# Patient Record
Sex: FEMALE | Race: WHITE | NOT HISPANIC OR LATINO | Employment: FULL TIME | RURAL
[De-identification: names, ages, dates, MRNs, and addresses within clinical notes are randomized per-mention and may not be internally consistent; named-entity substitution may affect disease eponyms.]

---

## 2021-07-11 ENCOUNTER — HOSPITAL ENCOUNTER (EMERGENCY)
Facility: HOSPITAL | Age: 31
Discharge: HOME | End: 2021-07-11
Payer: MEDICAID

## 2021-07-11 VITALS
DIASTOLIC BLOOD PRESSURE: 79 MMHG | WEIGHT: 173 LBS | RESPIRATION RATE: 16 BRPM | OXYGEN SATURATION: 100 % | SYSTOLIC BLOOD PRESSURE: 124 MMHG | TEMPERATURE: 98 F | BODY MASS INDEX: 28.82 KG/M2 | HEART RATE: 76 BPM | HEIGHT: 65 IN

## 2021-07-11 DIAGNOSIS — S99.911A INJURY OF RIGHT ANKLE: ICD-10-CM

## 2021-07-11 DIAGNOSIS — S93.401A SPRAIN OF RIGHT ANKLE, UNSPECIFIED LIGAMENT, INITIAL ENCOUNTER: Primary | ICD-10-CM

## 2021-07-11 PROCEDURE — 99283 EMERGENCY DEPT VISIT LOW MDM: CPT | Mod: ,,, | Performed by: NURSE PRACTITIONER

## 2021-07-11 PROCEDURE — 63600175 PHARM REV CODE 636 W HCPCS: Performed by: NURSE PRACTITIONER

## 2021-07-11 PROCEDURE — 99283 PR EMERGENCY DEPT VISIT,LEVEL III: ICD-10-PCS | Mod: ,,, | Performed by: NURSE PRACTITIONER

## 2021-07-11 PROCEDURE — 96372 THER/PROPH/DIAG INJ SC/IM: CPT

## 2021-07-11 PROCEDURE — 99284 EMERGENCY DEPT VISIT MOD MDM: CPT

## 2021-07-11 RX ORDER — FLUOXETINE 10 MG/1
10 CAPSULE ORAL DAILY
COMMUNITY

## 2021-07-11 RX ORDER — KETOROLAC TROMETHAMINE 30 MG/ML
30 INJECTION, SOLUTION INTRAMUSCULAR; INTRAVENOUS
Status: COMPLETED | OUTPATIENT
Start: 2021-07-11 | End: 2021-07-11

## 2021-07-11 RX ADMIN — KETOROLAC TROMETHAMINE 30 MG: 30 INJECTION, SOLUTION INTRAMUSCULAR at 11:07

## 2021-07-12 ENCOUNTER — TELEPHONE (OUTPATIENT)
Dept: EMERGENCY MEDICINE | Facility: HOSPITAL | Age: 31
End: 2021-07-12

## 2023-05-30 ENCOUNTER — HOSPITAL ENCOUNTER (EMERGENCY)
Facility: HOSPITAL | Age: 33
Discharge: HOME | End: 2023-05-30
Payer: MEDICAID

## 2023-05-30 VITALS
TEMPERATURE: 99 F | WEIGHT: 206.38 LBS | HEART RATE: 84 BPM | HEIGHT: 65 IN | BODY MASS INDEX: 34.38 KG/M2 | OXYGEN SATURATION: 96 % | DIASTOLIC BLOOD PRESSURE: 70 MMHG | RESPIRATION RATE: 18 BRPM | SYSTOLIC BLOOD PRESSURE: 139 MMHG
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
## 2023-05-30 PROCEDURE — 99284 EMERGENCY DEPT VISIT MOD MDM: CPT | Mod: ,,, | Performed by: NURSE PRACTITIONER

## 2023-05-30 PROCEDURE — 99284 EMERGENCY DEPT VISIT MOD MDM: CPT | Mod: 25

## 2023-05-30 PROCEDURE — 90471 IMMUNIZATION ADMIN: CPT | Performed by: NURSE PRACTITIONER

## 2023-05-30 PROCEDURE — 63600175 PHARM REV CODE 636 W HCPCS: Performed by: NURSE PRACTITIONER

## 2023-05-30 PROCEDURE — 90715 TDAP VACCINE 7 YRS/> IM: CPT | Performed by: NURSE PRACTITIONER

## 2023-05-30 PROCEDURE — 99284 PR EMERGENCY DEPT VISIT,LEVEL IV: ICD-10-PCS | Mod: ,,, | Performed by: NURSE PRACTITIONER

## 2023-05-30 RX ORDER — DOXYCYCLINE 100 MG/1
100 CAPSULE ORAL 2 TIMES DAILY
Qty: 20 CAPSULE | Refills: 0 | Status: SHIPPED | OUTPATIENT
Start: 2023-05-30 | End: 2023-06-09

## 2023-05-30 RX ADMIN — TETANUS TOXOID, REDUCED DIPHTHERIA TOXOID AND ACELLULAR PERTUSSIS VACCINE, ADSORBED 0.5 ML: 5; 2.5; 8; 8; 2.5 SUSPENSION INTRAMUSCULAR at 07:05

## 2023-05-30 NOTE — Clinical Note
"Eliza Anders" Carine was seen and treated in our emergency department on 5/30/2023.  She may return to work on 05/31/2023.       If you have any questions or concerns, please don't hesitate to call.      ELMIRA Dwyer"

## 2023-05-31 NOTE — DISCHARGE INSTRUCTIONS
Keep the wound clean and dry.  Take antibiotic as prescribed.  Return to the ER for fever, purulent drainage or redness.

## 2023-05-31 NOTE — ED PROVIDER NOTES
Encounter Date: 5/30/2023       History     Chief Complaint   Patient presents with    Animal Bite     33 year old  female presents to the ER for dog bite to the back of her left lower leg this afternoon.  She does not know who the dog belongs to.     The history is provided by the patient.   Animal Bite   The incident occurred 2 to 3 hours ago. The incident occurred at home. She came to the ER via by private vehicle. Pertinent negatives include no chest pain, no altered mental status, no numbness, no visual disturbance, no abdominal pain, no bowel incontinence, no nausea, no vomiting, no bladder incontinence, no headaches, no hearing loss, no inability to bear weight, no neck pain, no pain when bearing weight, no focal weakness, no decreased responsiveness, no light-headedness, no loss of consciousness, no seizures, no tingling, no weakness, no cough, no difficulty breathing and no memory loss. There have been no prior injuries to these areas. Her tetanus status is out of date. She has received no recent medical care.   Review of patient's allergies indicates:   Allergen Reactions    Cefaclor Hives     Past Medical History:   Diagnosis Date    Depression      Past Surgical History:   Procedure Laterality Date    TONSILLECTOMY       History reviewed. No pertinent family history.  Social History     Tobacco Use    Smoking status: Never    Smokeless tobacco: Never   Substance Use Topics    Alcohol use: Never    Drug use: Never     Review of Systems   Constitutional:  Negative for decreased responsiveness and fever.   HENT:  Negative for hearing loss and sore throat.    Eyes:  Negative for visual disturbance.   Respiratory:  Negative for cough and shortness of breath.    Cardiovascular:  Negative for chest pain.   Gastrointestinal:  Negative for abdominal pain, bowel incontinence, nausea and vomiting.   Genitourinary:  Negative for bladder incontinence and dysuria.   Musculoskeletal:  Negative for back pain  and neck pain.   Skin:  Positive for wound. Negative for rash.   Neurological:  Negative for tingling, focal weakness, seizures, loss of consciousness, weakness, light-headedness, numbness and headaches.   Hematological:  Does not bruise/bleed easily.   Psychiatric/Behavioral:  Negative for memory loss.      Physical Exam     Initial Vitals [05/30/23 1946]   BP Pulse Resp Temp SpO2   (!) 147/71 90 18 98.6 °F (37 °C) 96 %      MAP       --         Physical Exam    Nursing note and vitals reviewed.  Constitutional: She appears well-developed and well-nourished. She is not diaphoretic. She is Obese . She is cooperative.  Non-toxic appearance. She does not have a sickly appearance. She does not appear ill. No distress.   HENT:   Head: Normocephalic and atraumatic.   Eyes: Pupils are equal, round, and reactive to light.   Neck: Neck supple.   Cardiovascular:  Normal rate, regular rhythm, normal heart sounds and intact distal pulses.     Exam reveals no gallop and no friction rub.       No murmur heard.  Pulmonary/Chest: Breath sounds normal. No respiratory distress. She has no wheezes. She has no rhonchi. She has no rales. She exhibits no tenderness.   Musculoskeletal:      Cervical back: Neck supple.     Neurological: She is alert and oriented to person, place, and time.   Skin: Skin is warm and dry. Capillary refill takes less than 2 seconds.        Psychiatric: She has a normal mood and affect.       Medical Screening Exam   See Full Note    ED Course   Procedures  Labs Reviewed - No data to display       Imaging Results    None          Medications   Tdap (BOOSTRIX) vaccine injection 0.5 mL (has no administration in time range)     Medical Decision Making:   ED Management:  Patient has allergy to Ceclor.  Will send home of Doxy                       Clinical Impression:   Final diagnoses:  [W54.0XXA] Dog bite, initial encounter (Primary)        ED Disposition Condition    Discharge Stable          ED Prescriptions        Medication Sig Dispense Start Date End Date Auth. Provider    doxycycline (VIBRAMYCIN) 100 MG Cap Take 1 capsule (100 mg total) by mouth 2 (two) times daily. for 10 days 20 capsule 5/30/2023 6/9/2023 ELMIRA Dwyer          Follow-up Information       Follow up With Specialties Details Why Contact Info    [place] [facility]  Schedule an appointment as soon as possible for a visit in 1 week For wound re-check [address] [place] [id number]  [phone]             ELMIRA Dwyer  05/30/23 1954

## 2024-10-05 ENCOUNTER — HOSPITAL ENCOUNTER (EMERGENCY)
Facility: HOSPITAL | Age: 34
Discharge: HOME | End: 2024-10-05
Payer: MEDICAID

## 2024-10-05 VITALS
HEIGHT: 65 IN | OXYGEN SATURATION: 98 % | HEART RATE: 94 BPM | RESPIRATION RATE: 18 BRPM | BODY MASS INDEX: 33.66 KG/M2 | SYSTOLIC BLOOD PRESSURE: 165 MMHG | DIASTOLIC BLOOD PRESSURE: 105 MMHG | WEIGHT: 202 LBS | TEMPERATURE: 98 F

## 2024-10-05 DIAGNOSIS — L60.0 INGROWN NAIL OF GREAT TOE OF RIGHT FOOT: Primary | ICD-10-CM

## 2024-10-05 DIAGNOSIS — L08.9 TOE INFECTION: ICD-10-CM

## 2024-10-05 PROCEDURE — 99284 EMERGENCY DEPT VISIT MOD MDM: CPT | Mod: ,,, | Performed by: NURSE PRACTITIONER

## 2024-10-05 PROCEDURE — 99284 EMERGENCY DEPT VISIT MOD MDM: CPT

## 2024-10-05 RX ORDER — ACYCLOVIR 400 MG/1
400 TABLET ORAL
COMMUNITY
Start: 2024-09-30

## 2024-10-05 RX ORDER — SULFAMETHOXAZOLE AND TRIMETHOPRIM 800; 160 MG/1; MG/1
1 TABLET ORAL 2 TIMES DAILY
Qty: 14 TABLET | Refills: 0 | Status: SHIPPED | OUTPATIENT
Start: 2024-10-05 | End: 2024-10-12

## 2024-10-05 RX ORDER — MUPIROCIN 20 MG/G
OINTMENT TOPICAL 3 TIMES DAILY
Qty: 22 G | Refills: 0 | Status: SHIPPED | OUTPATIENT
Start: 2024-10-05

## 2024-10-05 NOTE — DISCHARGE INSTRUCTIONS
Clean with over the counter antibacterial soap and water. Apply the antibacterial ointment as ordered and take Bactrim as ordered.  Try to wear loose fitting shoes.

## 2024-10-05 NOTE — ED PROVIDER NOTES
Encounter Date: 10/5/2024       History     Chief Complaint   Patient presents with    Toe Pain     Pain to rt great toe     34 year old  female who presents to the ER for right great toe pain, redness, swelling. Denies injury    The history is provided by the patient.     Review of patient's allergies indicates:   Allergen Reactions    Cefaclor Hives     Past Medical History:   Diagnosis Date    Depression      Past Surgical History:   Procedure Laterality Date    TONSILLECTOMY       No family history on file.  Social History     Tobacco Use    Smoking status: Never    Smokeless tobacco: Never   Substance Use Topics    Alcohol use: Never    Drug use: Never     Review of Systems   Constitutional:  Negative for fever.   HENT:  Negative for sore throat.    Respiratory:  Negative for shortness of breath.    Cardiovascular:  Negative for chest pain.   Gastrointestinal:  Negative for nausea.   Genitourinary:  Negative for dysuria.   Musculoskeletal:  Negative for back pain.   Skin:  Positive for wound. Negative for rash.   Neurological:  Negative for weakness.   Hematological:  Does not bruise/bleed easily.       Physical Exam     Initial Vitals [10/05/24 1536]   BP Pulse Resp Temp SpO2   (!) 165/105 94 18 98.1 °F (36.7 °C) 98 %      MAP       --         Physical Exam    Nursing note and vitals reviewed.  Constitutional: She appears well-developed and well-nourished. She is not diaphoretic. No distress.   HENT:   Head: Normocephalic and atraumatic.   Eyes: Pupils are equal, round, and reactive to light.   Neck: Neck supple.   Cardiovascular:  Normal rate, regular rhythm, normal heart sounds and intact distal pulses.     Exam reveals no gallop and no friction rub.       No murmur heard.  Pulmonary/Chest: Breath sounds normal. No respiratory distress. She has no wheezes. She has no rhonchi. She has no rales. She exhibits no tenderness.   Musculoskeletal:      Cervical back: Neck supple.      Comments: Right great  toe on medial side of the nail noted with mild erythema, swelling and blood drainage.  Cap refill less than 3 seconds.      Neurological: She is alert and oriented to person, place, and time.   Skin: Skin is warm and dry. Capillary refill takes less than 2 seconds.   Psychiatric: She has a normal mood and affect.         Medical Screening Exam   See Full Note    ED Course   Procedures  Labs Reviewed - No data to display       Imaging Results    None          Medications - No data to display  Medical Decision Making  Problems Addressed:  Ingrown nail of great toe of right foot: self-limited or minor problem  Toe infection: self-limited or minor problem    Risk  Prescription drug management.                                      Clinical Impression:   Final diagnoses:  [L60.0] Ingrown nail of great toe of right foot (Primary)  [L08.9] Toe infection        ED Disposition Condition    Discharge Stable          ED Prescriptions       Medication Sig Dispense Start Date End Date Auth. Provider    mupirocin (BACTROBAN) 2 % ointment Apply topically 3 (three) times daily. 22 g 10/5/2024 -- Clau Olivares FNP    sulfamethoxazole-trimethoprim 800-160mg (BACTRIM DS) 800-160 mg Tab Take 1 tablet by mouth 2 (two) times daily. for 7 days 14 tablet 10/5/2024 10/12/2024 Clau Olivares FNP          Follow-up Information       Follow up With Specialties Details Why Contact Info    [facility]  Schedule an appointment as soon as possible for a visit in 1 week For wound re-check [address] [facility] [address] [phone]             Clau Olivares FNP  10/05/24 2390

## 2024-10-05 NOTE — Clinical Note
"Eliza Anders" Carine was seen and treated in our emergency department on 10/5/2024.  She may return to work on 10/06/2024.       If you have any questions or concerns, please don't hesitate to call.      Clau Olivares, ELMIRA"

## 2024-10-07 ENCOUNTER — HOSPITAL ENCOUNTER (EMERGENCY)
Facility: HOSPITAL | Age: 34
Discharge: HOME | End: 2024-10-07
Payer: MEDICAID

## 2024-10-07 VITALS
BODY MASS INDEX: 34.8 KG/M2 | OXYGEN SATURATION: 100 % | SYSTOLIC BLOOD PRESSURE: 117 MMHG | DIASTOLIC BLOOD PRESSURE: 93 MMHG | HEIGHT: 64 IN | TEMPERATURE: 98 F | WEIGHT: 203.81 LBS | RESPIRATION RATE: 18 BRPM | HEART RATE: 73 BPM

## 2024-10-07 DIAGNOSIS — R00.2 PALPITATIONS: ICD-10-CM

## 2024-10-07 LAB
ALBUMIN SERPL BCP-MCNC: 3.9 G/DL (ref 3.5–5)
ALBUMIN/GLOB SERPL: 1 {RATIO}
ALP SERPL-CCNC: 84 U/L (ref 37–98)
ALT SERPL W P-5'-P-CCNC: 41 U/L (ref 13–56)
ANION GAP SERPL CALCULATED.3IONS-SCNC: 14 MMOL/L (ref 7–16)
AST SERPL W P-5'-P-CCNC: 25 U/L (ref 15–37)
BASOPHILS # BLD AUTO: 0.04 K/UL (ref 0–0.2)
BASOPHILS NFR BLD AUTO: 0.4 % (ref 0–1)
BILIRUB SERPL-MCNC: 0.3 MG/DL (ref ?–1.2)
BUN SERPL-MCNC: 14 MG/DL (ref 7–18)
BUN/CREAT SERPL: 13 (ref 6–20)
CALCIUM SERPL-MCNC: 9.6 MG/DL (ref 8.5–10.1)
CHLORIDE SERPL-SCNC: 100 MMOL/L (ref 98–107)
CO2 SERPL-SCNC: 25 MMOL/L (ref 21–32)
CREAT SERPL-MCNC: 1.12 MG/DL (ref 0.55–1.02)
DIFFERENTIAL METHOD BLD: ABNORMAL
EGFR (NO RACE VARIABLE) (RUSH/TITUS): 67 ML/MIN/1.73M2
EOSINOPHIL # BLD AUTO: 0.31 K/UL (ref 0–0.5)
EOSINOPHIL NFR BLD AUTO: 3.4 % (ref 1–4)
ERYTHROCYTE [DISTWIDTH] IN BLOOD BY AUTOMATED COUNT: 12.4 % (ref 11.5–14.5)
GLOBULIN SER-MCNC: 4 G/DL (ref 2–4)
GLUCOSE SERPL-MCNC: 91 MG/DL (ref 74–106)
HCG UR QL IA.RAPID: NEGATIVE
HCT VFR BLD AUTO: 40.6 % (ref 38–47)
HGB BLD-MCNC: 13.8 G/DL (ref 12–16)
IMM GRANULOCYTES # BLD AUTO: 0.03 K/UL (ref 0–0.04)
IMM GRANULOCYTES NFR BLD: 0.3 % (ref 0–0.4)
LYMPHOCYTES # BLD AUTO: 2.75 K/UL (ref 1–4.8)
LYMPHOCYTES NFR BLD AUTO: 30.3 % (ref 27–41)
MAGNESIUM SERPL-MCNC: 1.9 MG/DL (ref 1.7–2.3)
MCH RBC QN AUTO: 29.2 PG (ref 27–31)
MCHC RBC AUTO-ENTMCNC: 34 G/DL (ref 32–36)
MCV RBC AUTO: 86 FL (ref 80–96)
MONOCYTES # BLD AUTO: 0.65 K/UL (ref 0–0.8)
MONOCYTES NFR BLD AUTO: 7.2 % (ref 2–6)
MPC BLD CALC-MCNC: 10.1 FL (ref 9.4–12.4)
NEUTROPHILS # BLD AUTO: 5.3 K/UL (ref 1.8–7.7)
NEUTROPHILS NFR BLD AUTO: 58.4 % (ref 53–65)
NRBC # BLD AUTO: 0 X10E3/UL
NRBC, AUTO (.00): 0 %
PLATELET # BLD AUTO: 322 K/UL (ref 150–400)
POTASSIUM SERPL-SCNC: 3.8 MMOL/L (ref 3.5–5.1)
PROT SERPL-MCNC: 7.9 G/DL (ref 6.4–8.2)
RBC # BLD AUTO: 4.72 M/UL (ref 4.2–5.4)
SODIUM SERPL-SCNC: 135 MMOL/L (ref 136–145)
WBC # BLD AUTO: 9.08 K/UL (ref 4.5–11)

## 2024-10-07 PROCEDURE — 81025 URINE PREGNANCY TEST: CPT | Performed by: NURSE PRACTITIONER

## 2024-10-07 PROCEDURE — 99284 EMERGENCY DEPT VISIT MOD MDM: CPT | Mod: ,,, | Performed by: NURSE PRACTITIONER

## 2024-10-07 PROCEDURE — 84443 ASSAY THYROID STIM HORMONE: CPT | Performed by: NURSE PRACTITIONER

## 2024-10-07 PROCEDURE — 85025 COMPLETE CBC W/AUTO DIFF WBC: CPT | Performed by: NURSE PRACTITIONER

## 2024-10-07 PROCEDURE — 83735 ASSAY OF MAGNESIUM: CPT | Performed by: NURSE PRACTITIONER

## 2024-10-07 PROCEDURE — 99285 EMERGENCY DEPT VISIT HI MDM: CPT | Mod: 25

## 2024-10-07 PROCEDURE — 36415 COLL VENOUS BLD VENIPUNCTURE: CPT | Performed by: NURSE PRACTITIONER

## 2024-10-07 PROCEDURE — 93005 ELECTROCARDIOGRAM TRACING: CPT

## 2024-10-07 PROCEDURE — 80053 COMPREHEN METABOLIC PANEL: CPT | Performed by: NURSE PRACTITIONER

## 2024-10-07 NOTE — ED PROVIDER NOTES
Encounter Date: 10/7/2024       History     Chief Complaint   Patient presents with    Palpitations     Pt is a 34 year old  female who presents to the ER for palpitations for 2 days.  She reports they are typically continuous with episodes where they wax and wane.  Pt reports she will feel at times like it takes her breath away.  Pt reports she has hx of anxiety and has been under a lot of stress lately.  She reports that she hasn't taken meds for anxiety in years.      The history is provided by the patient.     Review of patient's allergies indicates:   Allergen Reactions    Cefaclor Hives     Past Medical History:   Diagnosis Date    Depression      Past Surgical History:   Procedure Laterality Date    TONSILLECTOMY       No family history on file.  Social History     Tobacco Use    Smoking status: Never    Smokeless tobacco: Never   Substance Use Topics    Alcohol use: Never    Drug use: Never     Review of Systems   Constitutional:  Negative for fever.   HENT:  Negative for sore throat.    Respiratory:  Positive for shortness of breath. Negative for cough.    Cardiovascular:  Positive for palpitations. Negative for chest pain.   Gastrointestinal:  Negative for nausea.   Genitourinary:  Negative for dysuria.   Musculoskeletal:  Negative for back pain.   Skin:  Negative for rash.   Neurological:  Negative for weakness.   Hematological:  Does not bruise/bleed easily.       Physical Exam     Initial Vitals [10/07/24 1849]   BP Pulse Resp Temp SpO2   (!) 175/96 98 20 97.6 °F (36.4 °C) 100 %      MAP       --         Physical Exam    Nursing note and vitals reviewed.  Constitutional: She appears well-developed and well-nourished. She is not diaphoretic. She is Obese . She is cooperative.  Non-toxic appearance. She does not have a sickly appearance. She does not appear ill. No distress. She is not intubated.   HENT:   Head: Normocephalic and atraumatic.   Eyes: Pupils are equal, round, and reactive to light.    Neck: Neck supple.   Cardiovascular:  Normal rate, regular rhythm, normal heart sounds, intact distal pulses and normal pulses.     Exam reveals no gallop and no friction rub.       No murmur heard.  Pulmonary/Chest: Effort normal and breath sounds normal. No accessory muscle usage. No apnea, no tachypnea and no bradypnea. She is not intubated. No respiratory distress. She has no decreased breath sounds. She has no wheezes. She has no rhonchi. She has no rales. She exhibits no tenderness.   Musculoskeletal:      Cervical back: Neck supple.     Neurological: She is alert and oriented to person, place, and time.   Skin: Skin is warm and dry. Capillary refill takes less than 2 seconds.   Psychiatric: She has a normal mood and affect.         Medical Screening Exam   See Full Note    ED Course   Procedures  Labs Reviewed   COMPREHENSIVE METABOLIC PANEL - Abnormal       Result Value    Sodium 135 (*)     Potassium 3.8      Chloride 100      CO2 25      Anion Gap 14      Glucose 91      BUN 14      Creatinine 1.12 (*)     BUN/Creatinine Ratio 13      Calcium 9.6      Total Protein 7.9      Albumin 3.9      Globulin 4.0      A/G Ratio 1.0      Bilirubin, Total 0.3      Alk Phos 84      ALT 41      AST 25      eGFR 67     CBC WITH DIFFERENTIAL - Abnormal    WBC 9.08      RBC 4.72      Hemoglobin 13.8      Hematocrit 40.6      MCV 86.0      MCH 29.2      MCHC 34.0      RDW 12.4      Platelet Count 322      MPV 10.1      Neutrophils % 58.4      Lymphocytes % 30.3      Monocytes % 7.2 (*)     Eosinophils % 3.4      Basophils % 0.4      Immature Granulocytes % 0.3      nRBC, Auto 0.0      Neutrophils, Abs 5.30      Lymphocytes, Absolute 2.75      Monocytes, Absolute 0.65      Eosinophils, Absolute 0.31      Basophils, Absolute 0.04      Immature Granulocytes, Absolute 0.03      nRBC, Absolute 0.00      Diff Type Auto     MAGNESIUM - Normal    Magnesium 1.9     HCG QUALITATIVE URINE - Normal    HCG Qualitative, Urine Negative      CBC W/ AUTO DIFFERENTIAL    Narrative:     The following orders were created for panel order CBC auto differential.  Procedure                               Abnormality         Status                     ---------                               -----------         ------                     CBC with Differential[[id number]]        Abnormal            Final result                 Please view results for these tests on the individual orders.   TSH     EKG Readings: (Independently Interpreted)   Initial Reading: No STEMI. Rhythm: Normal Sinus Rhythm. Heart Rate: 95. Ectopy: No Ectopy. Conduction: Normal. ST Segments: Normal ST Segments. T Waves: Normal. Clinical Impression: Normal Sinus Rhythm       Imaging Results              X-Ray Chest PA And Lateral (Final result)  Result time 10/07/24 20:11:47      Final result by Jovon Chauhan MD (10/07/24 20:11:47)                   Impression:      No acute radiographic abnormality.      Electronically signed by: Jovon Chauhan  Date:    10/07/2024  Time:    20:11               Narrative:    EXAMINATION:  XR CHEST PA AND LATERAL    CLINICAL HISTORY:  Palpitations    TECHNIQUE:  PA and lateral views of the chest were performed.    COMPARISON:  None    FINDINGS:  The lungs are clear, with normal appearance of pulmonary vasculature and no pleural effusion or pneumothorax.    The cardiac silhouette is normal in size. The hilar and mediastinal contours are unremarkable.    Bones are intact.                                       Medications - No data to display  Medical Decision Making  Problems Addressed:  Palpitations: acute illness or injury that poses a threat to life or bodily functions    Amount and/or Complexity of Data Reviewed  Labs: ordered. Decision-making details documented in ED Course.  Radiology: ordered. Decision-making details documented in ED Course.  ECG/medicine tests: ordered. Decision-making details documented in ED Course.               ED Course as of  10/07/24 2017   Mon Oct 07, 2024   2015 Work-up was negative for acute findings.  Heart rate/rhythm has been NSR since arrival.  Strict return precautions given. [AG]      ED Course User Index  [AG] Clau Olivares FNP                           Clinical Impression:   Final diagnoses:  [R00.2] Palpitations        ED Disposition Condition    Discharge Stable          ED Prescriptions    None       Follow-up Information       Follow up With Specialties Details Why Contact Info    Rupesh Mantilla MD Family [facility] on 10/9/2024  [address] [place] [address]  [facility] [place] [address]  [phone]               Clau Olivares FNP  10/07/24 2017     49.1

## 2024-10-07 NOTE — ED TRIAGE NOTES
Presents to ER with c/o palpitations. States it seems like it started about 2 days ago.. Was seen here a couple of days ago and was placed on bactrim and acylcovier. Seems to started fast heart rate after that. Has an appointment with Dr. Mantilla on Wednesday. Has a history of anxiety but has been off meds for 10-12 years. Has been having more than usual amount of stress lately. Denies any chest pain.

## 2024-10-07 NOTE — Clinical Note
"Eliza Anders"Carine was seen and treated in our emergency department on 10/7/2024.  She may return to work on 10/08/2024.       If you have any questions or concerns, please don't hesitate to call.      Clau Olivares, ELMIRA"

## 2024-10-08 LAB — TSH SERPL DL<=0.005 MIU/L-ACNC: 2.24 UIU/ML (ref 0.36–3.74)

## 2024-10-08 NOTE — DISCHARGE INSTRUCTIONS
Follow-up with Dr Mantilla as scheduled on Wednesday.  Let him know about your symptoms as well as your history of anxiety and ongoing stress.  If you develop worsening of your palpitations, chest pain or shortness of breath, return to the ER for a recheck.  If you feel the Bactrim is causing your symptoms, I would advise you to stop that medication. Avoid all caffeine.  Try to minimize the stress.

## 2024-10-09 ENCOUNTER — OFFICE VISIT (OUTPATIENT)
Dept: FAMILY MEDICINE | Facility: CLINIC | Age: 34
End: 2024-10-09
Payer: MEDICAID

## 2024-10-09 VITALS
WEIGHT: 199.88 LBS | BODY MASS INDEX: 34.12 KG/M2 | HEIGHT: 64 IN | DIASTOLIC BLOOD PRESSURE: 79 MMHG | SYSTOLIC BLOOD PRESSURE: 113 MMHG | HEART RATE: 84 BPM | OXYGEN SATURATION: 98 %

## 2024-10-09 DIAGNOSIS — R03.0 ELEVATED BLOOD PRESSURE READING: ICD-10-CM

## 2024-10-09 DIAGNOSIS — R00.2 PALPITATIONS: Primary | ICD-10-CM

## 2024-10-09 DIAGNOSIS — F41.9 ANXIETY: ICD-10-CM

## 2024-10-09 PROCEDURE — 3008F BODY MASS INDEX DOCD: CPT | Mod: CPTII,,, | Performed by: FAMILY MEDICINE

## 2024-10-09 PROCEDURE — 99204 OFFICE O/P NEW MOD 45 MIN: CPT | Mod: ,,, | Performed by: FAMILY MEDICINE

## 2024-10-09 PROCEDURE — 1160F RVW MEDS BY RX/DR IN RCRD: CPT | Mod: CPTII,,, | Performed by: FAMILY MEDICINE

## 2024-10-09 PROCEDURE — 1159F MED LIST DOCD IN RCRD: CPT | Mod: CPTII,,, | Performed by: FAMILY MEDICINE

## 2024-10-09 PROCEDURE — 3078F DIAST BP <80 MM HG: CPT | Mod: CPTII,,, | Performed by: FAMILY MEDICINE

## 2024-10-09 PROCEDURE — 3074F SYST BP LT 130 MM HG: CPT | Mod: CPTII,,, | Performed by: FAMILY MEDICINE

## 2024-10-09 NOTE — PROGRESS NOTES
Rupesh Mantilla MD   [facility]  MEDICAL GROUP [facility] FAM[facility]  [address] [place] [address] [place] [id number]  [phone]      PATIENT NAME: Eliza Hawk  : 1990  DATE: 10/9/24  MRN: [id number]      Billing Provider: Rupesh Mantilla MD  Level of Service: IA OFFICE/OUTPT VISIT, NEW, LEVL IV, 45-59 MIN  Patient PCP Information       Provider PCP Type    Rupesh Mantilla MD General            Reason for Visit / Chief Complaint: Palpitations, Shortness of Breath, and Hypertension (Ingrown toenail)       Update PCP  Update Chief Complaint         History of Present Illness / Problem Focused Workflow     Eliza Hawk presents to the clinic with Palpitations, Shortness of Breath, and Hypertension (Ingrown toenail)       New patient.  Follow up from ED visit on 10/07/24 for palpitations.  Cardiac workup was negative.  I have reviewed encounter and all associated labs/imaging/EKG.  BP was elevated.   It is very well controlled today.  Says that palpitations have been present for about a week and are fairly constant.  Started acyclovir daily about 1 1/2 weeks ago.  No other med changes.      Palpitations   Associated symptoms include shortness of breath. Pertinent negatives include no chest pain, coughing, dizziness, fever or weakness.   Shortness of Breath  Pertinent negatives include no abdominal pain, chest pain, fever or sore throat.   Hypertension  Associated symptoms include palpitations and shortness of breath. Pertinent negatives include no chest pain.       Review of Systems     Review of Systems   Constitutional:  Negative for activity change, chills and fever.   HENT:  Negative for sore throat.    Eyes:  Negative for pain.   Respiratory:  Positive for shortness of breath. Negative for cough and chest tightness.    Cardiovascular:  Positive for palpitations. Negative for chest pain.   Gastrointestinal:  Negative for abdominal pain.   Neurological:  Negative for  dizziness, syncope and weakness.   Psychiatric/Behavioral:  Negative for confusion.         Medical / Social / Family History     Past Medical History:   Diagnosis Date    Depression        Past Surgical History:   Procedure Laterality Date    TONSILLECTOMY         Social History    reports that she has never smoked. She has never used smokeless tobacco. She reports that she does not currently use alcohol. She reports that she does not use drugs.   Social History     Tobacco Use    Smoking status: Never    Smokeless tobacco: Never   Substance Use Topics    Alcohol use: Not Currently    Drug use: Never       Family History  Family History   Problem Relation Name Age of Onset    Diabetes Father Franco Hawk     Heart disease Father Franco Hawk     Hypertension Father Franco Hawk        Medications and Allergies     Medications  Outpatient Medications Marked as Taking for the 10/9/24 encounter (Office Visit) with Rupesh Mantilla MD   Medication Sig Dispense Refill    acyclovir (ZOVIRAX) 400 MG tablet Take 400 mg by mouth.      mupirocin (BACTROBAN) 2 % ointment Apply topically 3 (three) times daily. 22 g 0    sulfamethoxazole-trimethoprim 800-160mg (BACTRIM DS) 800-160 mg Tab Take 1 tablet by mouth 2 (two) times daily. for 7 days 14 tablet 0       Allergies  Review of patient's allergies indicates:   Allergen Reactions    Cefaclor Hives       Physical Examination     Vitals:    10/09/24 1013   BP: 113/79   Pulse: 84     Physical Exam  Vitals reviewed.   Constitutional:       Appearance: Normal appearance.   HENT:      Head: Normocephalic and atraumatic.   Eyes:      Extraocular Movements: Extraocular movements intact.      Conjunctiva/sclera: Conjunctivae normal.      Pupils: Pupils are equal, round, and reactive to light.   Cardiovascular:      Rate and Rhythm: Normal rate and regular rhythm.      Heart sounds: Normal heart sounds.   Pulmonary:      Effort: Pulmonary effort is normal.      Breath sounds:  Normal breath sounds.   Musculoskeletal:         General: Normal range of motion.      Cervical back: Normal range of motion.   Skin:     General: Skin is warm and dry.   Neurological:      General: No focal deficit present.      Mental Status: She is alert and oriented to person, place, and time.   Psychiatric:         Mood and Affect: Mood normal.         Behavior: Behavior normal.          Admission on 10/07/2024, Discharged on 10/07/2024   Component Date Value Ref Range Status    Sodium 10/07/2024 135 (L)  136 - 145 mmol/L Final    Potassium 10/07/2024 3.8  3.5 - 5.1 mmol/L Final    Chloride 10/07/2024 100  98 - 107 mmol/L Final    CO2 10/07/2024 25  21 - 32 mmol/L Final    Anion Gap 10/07/2024 14  7 - 16 mmol/L Final    Glucose 10/07/2024 91  74 - 106 mg/dL Final    BUN 10/07/2024 14  7 - 18 mg/dL Final    Creatinine 10/07/2024 1.12 (H)  0.55 - 1.02 mg/dL Final    BUN/Creatinine Ratio 10/07/2024 13  6 - 20 Final    Calcium 10/07/2024 9.6  8.5 - 10.1 mg/dL Final    Total Protein 10/07/2024 7.9  6.4 - 8.2 g/dL Final    Albumin 10/07/2024 3.9  3.5 - 5.0 g/dL Final    Globulin 10/07/2024 4.0  2.0 - 4.0 g/dL Final    A/G Ratio 10/07/2024 1.0   Final    Bilirubin, Total 10/07/2024 0.3  >0.0 - 1.2 mg/dL Final    Alk Phos 10/07/2024 84  37 - 98 U/L Final    ALT 10/07/2024 41  13 - 56 U/L Final    AST 10/07/2024 25  15 - 37 U/L Final    eGFR 10/07/2024 67  >=60 mL/min/1.73m2 Final    Magnesium 10/07/2024 1.9  1.7 - 2.3 mg/dL Final    HCG Qualitative, Urine 10/07/2024 Negative  Negative Final    TSH 10/07/2024 2.240  0.358 - 3.740 uIU/mL Final    WBC 10/07/2024 9.08  4.50 - 11.00 K/uL Final    RBC 10/07/2024 4.72  4.20 - 5.40 M/uL Final    Hemoglobin 10/07/2024 13.8  12.0 - 16.0 g/dL Final    Hematocrit 10/07/2024 40.6  38.0 - 47.0 % Final    MCV 10/07/2024 86.0  80.0 - 96.0 fL Final    MCH 10/07/2024 29.2  27.0 - 31.0 pg Final    MCHC 10/07/2024 34.0  32.0 - 36.0 g/dL Final    RDW 10/07/2024 12.4  11.5 - 14.5 % Final     Platelet Count 10/07/2024 322  150 - 400 K/uL Final    MPV 10/07/2024 10.1  9.4 - 12.4 fL Final    Neutrophils % 10/07/2024 58.4  53.0 - 65.0 % Final    Lymphocytes % 10/07/2024 30.3  27.0 - 41.0 % Final    Monocytes % 10/07/2024 7.2 (H)  2.0 - 6.0 % Final    Eosinophils % 10/07/2024 3.4  1.0 - 4.0 % Final    Basophils % 10/07/2024 0.4  0.0 - 1.0 % Final    Immature Granulocytes % 10/07/2024 0.3  0.0 - 0.4 % Final    nRBC, Auto 10/07/2024 0.0  <=0.0 % Final    Neutrophils, Abs 10/07/2024 5.30  1.80 - 7.70 K/uL Final    Lymphocytes, Absolute 10/07/2024 2.75  1.00 - 4.80 K/uL Final    Monocytes, Absolute 10/07/2024 0.65  0.00 - 0.80 K/uL Final    Eosinophils, Absolute 10/07/2024 0.31  0.00 - 0.50 K/uL Final    Basophils, Absolute 10/07/2024 0.04  0.00 - 0.20 K/uL Final    Immature Granulocytes, Absolute 10/07/2024 0.03  0.00 - 0.04 K/uL Final    nRBC, Absolute 10/07/2024 0.00  <=0.00 x10e3/uL Final    Diff Type 10/07/2024 Auto   Final     X-Ray Chest PA And Lateral  Narrative: EXAMINATION:  XR CHEST PA AND LATERAL    CLINICAL HISTORY:  Palpitations    TECHNIQUE:  PA and lateral views of the chest were performed.    COMPARISON:  None    FINDINGS:  The lungs are clear, with normal appearance of pulmonary vasculature and no pleural effusion or pneumothorax.    The cardiac silhouette is normal in size. The hilar and mediastinal contours are unremarkable.    Bones are intact.  Impression: No acute radiographic abnormality.    Electronically signed by: Jovon Lane  Date:    10/07/2024  Time:    20:11    EKG Readings: (Independently Interpreted) by ED provider  Initial Reading: No STEMI. Rhythm: Normal Sinus Rhythm. Heart Rate: 95. Ectopy: No Ectopy. Conduction: Normal. ST Segments: Normal ST Segments. T Waves: Normal. Clinical Impression: Normal Sinus Rhythm        Assessment and Plan (including Health Maintenance)      Problem List  Smart Sets  Document Outside HM   :    Plan: palpitations are a possible side effect of  acyclovir.  Will stop acyclovir to see if the palpitations resolve.          Health Maintenance Due   Topic Date Due    Hepatitis C Screening  Never done    Cervical Cancer Screening  Never done    Lipid Panel  Never done    HIV Screening  Never done    COVID-19 Vaccine (1 - 2024-25 season) Never done       Problem List Items Addressed This Visit    None  Visit Diagnoses       Palpitations    -  Primary    Elevated blood pressure reading        Anxiety                Health Maintenance Topics with due status: Not Due       Topic Last Completion Date    TETANUS VACCINE 05/30/2023    RSV Vaccine (Age 60+ and Pregnant patients) Not Due       Future Appointments   Date Time Provider Department Center   10/29/2024  1:15 PM Rupesh Mantilla MD [facility]            Signature:  Rupesh Mantilla MD  [facility]  [facility] [place] [facility]  [address] [place] [id number]  [phone]    Date of encounter: 10/9/24

## 2024-10-09 NOTE — PATIENT INSTRUCTIONS
Stop the acyclovir to see if your palpitations resolve.  Follow up in clinic as scheduled or sooner if needed.

## 2024-10-30 ENCOUNTER — OFFICE VISIT (OUTPATIENT)
Dept: FAMILY MEDICINE | Facility: CLINIC | Age: 34
End: 2024-10-30
Payer: MEDICAID

## 2024-10-30 VITALS
HEART RATE: 81 BPM | WEIGHT: 199.63 LBS | HEIGHT: 64 IN | SYSTOLIC BLOOD PRESSURE: 122 MMHG | DIASTOLIC BLOOD PRESSURE: 85 MMHG | OXYGEN SATURATION: 98 % | BODY MASS INDEX: 34.08 KG/M2

## 2024-10-30 DIAGNOSIS — B00.9 HSV INFECTION: Chronic | ICD-10-CM

## 2024-10-30 DIAGNOSIS — R00.2 PALPITATIONS: Primary | ICD-10-CM

## 2024-10-30 PROCEDURE — 99213 OFFICE O/P EST LOW 20 MIN: CPT | Mod: ,,, | Performed by: FAMILY MEDICINE

## 2024-10-30 PROCEDURE — 1159F MED LIST DOCD IN RCRD: CPT | Mod: CPTII,,, | Performed by: FAMILY MEDICINE

## 2024-10-30 PROCEDURE — 3079F DIAST BP 80-89 MM HG: CPT | Mod: CPTII,,, | Performed by: FAMILY MEDICINE

## 2024-10-30 PROCEDURE — 3074F SYST BP LT 130 MM HG: CPT | Mod: CPTII,,, | Performed by: FAMILY MEDICINE

## 2024-10-30 PROCEDURE — 1160F RVW MEDS BY RX/DR IN RCRD: CPT | Mod: CPTII,,, | Performed by: FAMILY MEDICINE

## 2024-10-30 PROCEDURE — 3008F BODY MASS INDEX DOCD: CPT | Mod: CPTII,,, | Performed by: FAMILY MEDICINE

## 2024-10-30 RX ORDER — VALACYCLOVIR HYDROCHLORIDE 500 MG/1
500 TABLET, FILM COATED ORAL DAILY
Qty: 30 TABLET | Refills: 0 | Status: SHIPPED | OUTPATIENT
Start: 2024-10-30 | End: 2024-10-30

## 2024-10-30 RX ORDER — VALACYCLOVIR HYDROCHLORIDE 500 MG/1
500 TABLET, FILM COATED ORAL DAILY
Qty: 30 TABLET | Refills: 0 | Status: SHIPPED | OUTPATIENT
Start: 2024-10-30 | End: 2025-10-30

## 2024-10-30 RX ORDER — VALACYCLOVIR HYDROCHLORIDE 500 MG/1
500 TABLET, FILM COATED ORAL DAILY
Qty: 30 TABLET | Refills: 5 | Status: SHIPPED | OUTPATIENT
Start: 2024-10-30 | End: 2025-10-30

## 2024-11-14 ENCOUNTER — OFFICE VISIT (OUTPATIENT)
Dept: FAMILY MEDICINE | Facility: CLINIC | Age: 34
End: 2024-11-14
Payer: MEDICAID

## 2024-11-14 VITALS
DIASTOLIC BLOOD PRESSURE: 74 MMHG | BODY MASS INDEX: 34.28 KG/M2 | HEART RATE: 88 BPM | OXYGEN SATURATION: 99 % | WEIGHT: 200.81 LBS | HEIGHT: 64 IN | SYSTOLIC BLOOD PRESSURE: 114 MMHG

## 2024-11-14 DIAGNOSIS — H92.01 RIGHT EAR PAIN: ICD-10-CM

## 2024-11-14 DIAGNOSIS — H65.191 OTHER ACUTE NONSUPPURATIVE OTITIS MEDIA OF RIGHT EAR, RECURRENCE NOT SPECIFIED: Primary | ICD-10-CM

## 2024-11-14 PROCEDURE — 3078F DIAST BP <80 MM HG: CPT | Mod: CPTII,,, | Performed by: FAMILY MEDICINE

## 2024-11-14 PROCEDURE — 3008F BODY MASS INDEX DOCD: CPT | Mod: CPTII,,, | Performed by: FAMILY MEDICINE

## 2024-11-14 PROCEDURE — 99213 OFFICE O/P EST LOW 20 MIN: CPT | Mod: ,,, | Performed by: FAMILY MEDICINE

## 2024-11-14 PROCEDURE — 1160F RVW MEDS BY RX/DR IN RCRD: CPT | Mod: CPTII,,, | Performed by: FAMILY MEDICINE

## 2024-11-14 PROCEDURE — 3074F SYST BP LT 130 MM HG: CPT | Mod: CPTII,,, | Performed by: FAMILY MEDICINE

## 2024-11-14 PROCEDURE — 1159F MED LIST DOCD IN RCRD: CPT | Mod: CPTII,,, | Performed by: FAMILY MEDICINE

## 2024-11-14 RX ORDER — AZITHROMYCIN 250 MG/1
TABLET, FILM COATED ORAL
Qty: 6 TABLET | Refills: 0 | Status: SHIPPED | OUTPATIENT
Start: 2024-11-14 | End: 2024-11-19

## 2024-11-14 RX ORDER — ACETAMINOPHEN AND CODEINE PHOSPHATE 300; 30 MG/1; MG/1
1 TABLET ORAL EVERY 4 HOURS PRN
Qty: 15 TABLET | Refills: 0 | Status: SHIPPED | OUTPATIENT
Start: 2024-11-14 | End: 2024-11-24

## 2024-11-14 RX ORDER — PSEUDOEPHEDRINE HCL 30 MG
TABLET ORAL
Qty: 24 TABLET | Refills: 0 | Status: SHIPPED | OUTPATIENT
Start: 2024-11-14

## 2024-11-14 NOTE — PROGRESS NOTES
"   Rupesh Mantilla MD   [facility]  MEDICAL GROUP [address] [address]  [phone]      PATIENT NAME: Eliza Hawk  : 1990  DATE: 24  MRN: [id number]      Billing Provider: Rupesh Mantilla MD  Level of Service:   Patient PCP Information       Provider PCP Type    Rupesh Mantilla MD General            Reason for Visit / Chief Complaint: right ear pain       Update PCP  Update Chief Complaint         History of Present Illness / Problem Focused Workflow     Eliza Hawk presents to the clinic with right ear pain       R ear pain x 1 week that has varied in intensity.  Has been "really bad" for the past day.  Has been using some otc ear drops but they are not helping.      Review of Systems     Review of Systems   Constitutional:  Negative for chills and fever.   HENT:  Positive for nasal congestion and ear pain. Negative for sore throat.    Respiratory:  Negative for cough.    Neurological:  Negative for dizziness and vertigo.        Medical / Social / Family History     Past Medical History:   Diagnosis Date    Depression        Past Surgical History:   Procedure Laterality Date    TONSILLECTOMY         Social History    reports that she has never smoked. She has never used smokeless tobacco. She reports that she does not currently use alcohol. She reports that she does not use drugs.   Social History     Tobacco Use    Smoking status: Never    Smokeless tobacco: Never   Substance Use Topics    Alcohol use: Not Currently    Drug use: Never       Family History  Family History   Problem Relation Name Age of Onset    Diabetes Father Franco Hawk     Heart disease Father Franco Hawk     Hypertension Father Franco Hawk        Medications and Allergies     Medications  Outpatient Medications Marked as Taking for the 24 encounter (Office Visit) with Rupesh Mantilla MD   Medication Sig Dispense Refill    " valACYclovir (VALTREX) 500 MG tablet Take 1 tablet (500 mg total) by mouth once daily. 30 tablet 5       Allergies  Review of patient's allergies indicates:   Allergen Reactions    Cefaclor Hives       Physical Examination     Vitals:    11/14/24 1547   BP: 114/74   Pulse: 88     Physical Exam  Vitals reviewed.   Constitutional:       Appearance: Normal appearance.   HENT:      Head: Normocephalic and atraumatic.      Right Ear: Ear canal normal.      Left Ear: Tympanic membrane and ear canal normal.      Ears:      Comments: Bulging TM with fluid level  Eyes:      Extraocular Movements: Extraocular movements intact.      Conjunctiva/sclera: Conjunctivae normal.      Pupils: Pupils are equal, round, and reactive to light.   Cardiovascular:      Rate and Rhythm: Normal rate and regular rhythm.      Heart sounds: Normal heart sounds.   Pulmonary:      Effort: Pulmonary effort is normal.      Breath sounds: Normal breath sounds.   Musculoskeletal:         General: Normal range of motion.      Cervical back: Normal range of motion.   Skin:     General: Skin is warm and dry.   Neurological:      General: No focal deficit present.      Mental Status: She is alert and oriented to person, place, and time.   Psychiatric:         Mood and Affect: Mood normal.         Behavior: Behavior normal.          Assessment and Plan (including Health Maintenance)      Problem List  Smart Sets  Document Outside HM   :    Plan:         Health Maintenance Due   Topic Date Due    Hepatitis C Screening  Never done    Cervical Cancer Screening  Never done    Lipid Panel  Never done    HIV Screening  Never done    COVID-19 Vaccine (1 - 2024-25 season) Never done       Problem List Items Addressed This Visit    None  Visit Diagnoses       Other acute nonsuppurative otitis media of right ear, recurrence not specified    -  Primary    Relevant Medications    azithromycin (Z-EMY) 250 MG tablet    pseudoephedrine (SUDAFED) 30 MG tablet    Right ear  pain        Relevant Medications    pseudoephedrine (SUDAFED) 30 MG tablet    acetaminophen-codeine 300-30mg (TYLENOL #3) 300-30 mg Tab            Health Maintenance Topics with due status: Not Due       Topic Last Completion Date    TETANUS VACCINE 05/30/2023    RSV Vaccine (Age 60+ and Pregnant patients) Not Due       No future appointments.           Signature:  MD BAYLEE Chavez [facility]  MEDICAL GROUP [facility] - FAMILY MEDICINE  [address] [place] [id number]  [phone]    Date of encounter: 11/14/24

## 2025-02-04 ENCOUNTER — OFFICE VISIT (OUTPATIENT)
Dept: FAMILY MEDICINE | Facility: CLINIC | Age: 35
End: 2025-02-04

## 2025-02-04 VITALS
WEIGHT: 208.63 LBS | HEART RATE: 85 BPM | OXYGEN SATURATION: 99 % | BODY MASS INDEX: 35.62 KG/M2 | SYSTOLIC BLOOD PRESSURE: 114 MMHG | HEIGHT: 64 IN | DIASTOLIC BLOOD PRESSURE: 61 MMHG

## 2025-02-04 DIAGNOSIS — H65.191 OTHER ACUTE NONSUPPURATIVE OTITIS MEDIA OF RIGHT EAR, RECURRENCE NOT SPECIFIED: Primary | ICD-10-CM

## 2025-02-04 DIAGNOSIS — H92.01 RIGHT EAR PAIN: ICD-10-CM

## 2025-02-04 PROCEDURE — 99213 OFFICE O/P EST LOW 20 MIN: CPT | Mod: ,,, | Performed by: FAMILY MEDICINE

## 2025-02-04 RX ORDER — PSEUDOEPHEDRINE HCL 30 MG
TABLET ORAL
Qty: 24 TABLET | Refills: 0 | Status: SHIPPED | OUTPATIENT
Start: 2025-02-04

## 2025-02-04 RX ORDER — AZITHROMYCIN 250 MG/1
TABLET, FILM COATED ORAL
Qty: 6 TABLET | Refills: 0 | Status: SHIPPED | OUTPATIENT
Start: 2025-02-04 | End: 2025-02-09

## 2025-02-04 NOTE — PROGRESS NOTES
Rupesh Mantilla MD   [facility]  MEDICAL GROUP [facility] FAM[facility] [address] [address]  [phone]      PATIENT NAME: Eliza Hawk  : 1990  DATE: 25  MRN: [id number]      Billing Provider: Rupesh Mantilla MD  Level of Service:   Patient PCP Information       Provider PCP Type    Rupesh Mantilla MD General            Reason for Visit / Chief Complaint: Otalgia       Update PCP  Update Chief Complaint         History of Present Illness / Problem Focused Workflow     Eliza Hawk presents to the clinic with Otalgia       Says that this is very similar to when she was seen last November.  Has right ear pain.  Denies any sore throat, HA or dizziness.    Otalgia   There is pain in the right ear. This is a recurrent problem. The current episode started in the past 7 days. The problem occurs constantly. The problem has been unchanged. There has been no fever. The fever has been present for 5 days or more. The pain is at a severity of 7/10. The pain is mild. Pertinent negatives include no abdominal pain, coughing, diarrhea, drainage, ear discharge, headaches, hearing loss, neck pain, rash, rhinorrhea, sore throat or vomiting. She has tried ear drops for the symptoms. The treatment provided no relief. There is no history of a chronic ear infection, hearing loss or a tympanostomy tube.       Review of Systems     Review of Systems   HENT:  Positive for ear pain. Negative for ear discharge, hearing loss, rhinorrhea and sore throat.    Respiratory:  Negative for cough.    Gastrointestinal:  Negative for abdominal pain, diarrhea and vomiting.   Musculoskeletal:  Negative for neck pain.   Integumentary:  Negative for rash.   Neurological:  Negative for headaches.        Medical / Social / Family History     Past Medical History:   Diagnosis Date    Depression        Past Surgical History:   Procedure Laterality Date    TONSILLECTOMY         Social  History    reports that she has never smoked. She has never been exposed to tobacco smoke. She has never used smokeless tobacco. She reports that she does not currently use alcohol. She reports that she does not use drugs.   Social History     Tobacco Use    Smoking status: Never     Passive exposure: Never    Smokeless tobacco: Never   Substance Use Topics    Alcohol use: Not Currently    Drug use: Never       Family History  Family History   Problem Relation Name Age of Onset    Diabetes Father Franco Hawk     Heart disease Father Franco Hawk     Hypertension Father Franco Hawk        Medications and Allergies     Medications  No outpatient medications have been marked as taking for the 2/4/25 encounter (Office Visit) with Rupesh Mantilla MD.       Allergies  Review of patient's allergies indicates:   Allergen Reactions    Cefaclor Hives       Physical Examination     Vitals:    02/04/25 1545   BP: 114/61   Pulse: 85     Physical Exam  Vitals reviewed.   Constitutional:       Appearance: Normal appearance.   HENT:      Head: Normocephalic and atraumatic.      Right Ear: Ear canal normal.      Left Ear: Tympanic membrane and ear canal normal.      Ears:      Comments: Bulging TM with fluid level and erythema     Mouth/Throat:      Comments: benign  Eyes:      Extraocular Movements: Extraocular movements intact.      Conjunctiva/sclera: Conjunctivae normal.      Pupils: Pupils are equal, round, and reactive to light.   Cardiovascular:      Rate and Rhythm: Normal rate and regular rhythm.      Heart sounds: Normal heart sounds.   Pulmonary:      Effort: Pulmonary effort is normal.      Breath sounds: Normal breath sounds.   Musculoskeletal:         General: Normal range of motion.      Cervical back: Normal range of motion.   Skin:     General: Skin is warm and dry.   Neurological:      General: No focal deficit present.      Mental Status: She is alert and oriented to person, place, and time.    Psychiatric:         Mood and Affect: Mood normal.         Behavior: Behavior normal.          Assessment and Plan (including Health Maintenance)      Problem List  Smart Sets  Document Outside HM   :    Plan:         Health Maintenance Due   Topic Date Due    Hepatitis C Screening  Never done    Cervical Cancer Screening  Never done    Lipid Panel  Never done    HIV Screening  Never done    COVID-19 Vaccine (1 - 2024-25 season) Never done       Problem List Items Addressed This Visit    None  Visit Diagnoses       Other acute nonsuppurative otitis media of right ear, recurrence not specified    -  Primary    Relevant Medications    pseudoephedrine (SUDAFED) 30 MG tablet    azithromycin (Z-EMY) 250 MG tablet    Right ear pain        Relevant Medications    pseudoephedrine (SUDAFED) 30 MG tablet            Health Maintenance Topics with due status: Not Due       Topic Last Completion Date    TETANUS VACCINE 05/30/2023    RSV Vaccine (Age 60+ and Pregnant patients) Not Due       No future appointments.         Signature:  MD BYALEE Chavez [facility]  [facility]IC[facility]  [address] [place] [address]  [phone]    Date of encounter: 2/4/25

## 2025-05-08 ENCOUNTER — OFFICE VISIT (OUTPATIENT)
Dept: FAMILY MEDICINE | Facility: CLINIC | Age: 35
End: 2025-05-08
Payer: MEDICAID

## 2025-05-08 VITALS
HEART RATE: 96 BPM | DIASTOLIC BLOOD PRESSURE: 82 MMHG | HEIGHT: 64 IN | WEIGHT: 201.5 LBS | SYSTOLIC BLOOD PRESSURE: 118 MMHG | BODY MASS INDEX: 34.4 KG/M2 | OXYGEN SATURATION: 98 %

## 2025-05-08 DIAGNOSIS — F43.9 STRESS: Chronic | ICD-10-CM

## 2025-05-08 DIAGNOSIS — R00.2 PALPITATIONS: Primary | Chronic | ICD-10-CM

## 2025-05-08 PROCEDURE — 3079F DIAST BP 80-89 MM HG: CPT | Mod: CPTII,,, | Performed by: FAMILY MEDICINE

## 2025-05-08 PROCEDURE — 85025 COMPLETE CBC W/AUTO DIFF WBC: CPT | Mod: ,,, | Performed by: CLINICAL MEDICAL LABORATORY

## 2025-05-08 PROCEDURE — 3008F BODY MASS INDEX DOCD: CPT | Mod: CPTII,,, | Performed by: FAMILY MEDICINE

## 2025-05-08 PROCEDURE — 99214 OFFICE O/P EST MOD 30 MIN: CPT | Mod: ,,, | Performed by: FAMILY MEDICINE

## 2025-05-08 PROCEDURE — 80053 COMPREHEN METABOLIC PANEL: CPT | Mod: ,,, | Performed by: CLINICAL MEDICAL LABORATORY

## 2025-05-08 PROCEDURE — 84443 ASSAY THYROID STIM HORMONE: CPT | Mod: ,,, | Performed by: CLINICAL MEDICAL LABORATORY

## 2025-05-08 PROCEDURE — 1160F RVW MEDS BY RX/DR IN RCRD: CPT | Mod: CPTII,,, | Performed by: FAMILY MEDICINE

## 2025-05-08 PROCEDURE — 1159F MED LIST DOCD IN RCRD: CPT | Mod: CPTII,,, | Performed by: FAMILY MEDICINE

## 2025-05-08 PROCEDURE — 3074F SYST BP LT 130 MM HG: CPT | Mod: CPTII,,, | Performed by: FAMILY MEDICINE

## 2025-05-08 NOTE — PROGRESS NOTES
Rupesh Mantilla MD   [facility]  MEDICAL GROUP [facility] - FAMILY MEDICINE  [address] [place] [id number]  [phone]      PATIENT NAME: Eliza Hawk  : 1990  DATE: 25  MRN: [id number]      Billing Provider: Rupesh Mantilla MD  Level of Service: AR OFFICE/OUTPT VISIT, EST, LEVL IV, 30-39 MIN  Patient PCP Information       Provider PCP Type    Rupesh Mantilla MD General            Reason for Visit / Chief Complaint: heaviness in chest (Mainly at night/Heart palpitations) and Anxiety       Update PCP  Update Chief Complaint         History of Present Illness / Problem Focused Workflow     Eliza Hawk presents to the clinic with heaviness in chest (Mainly at night/Heart palpitations) and Anxiety       Has had palpitations intermittently over the past year.  Was originally seen in ED at onset and had negative cardiac workup.  She says that her father has CHF and mother has a-fib.  She is wanting a cardiology referral.  She does not consume caffeine or energy drinks.  Is not currently taking any daily rx or otc meds.  Does admit to being under a good deal of stress.          Review of Systems     Review of Systems   Constitutional:  Negative for activity change, chills and fever.   HENT:  Negative for sore throat.    Eyes:  Negative for pain.   Respiratory:  Negative for cough, chest tightness and shortness of breath.    Cardiovascular:  Positive for palpitations. Negative for chest pain.   Gastrointestinal:  Negative for abdominal pain.   Neurological:  Negative for dizziness, syncope and weakness.   Psychiatric/Behavioral:  Negative for confusion.         Medical / Social / Family History     Past Medical History:   Diagnosis Date    Depression        Past Surgical History:   Procedure Laterality Date    TONSILLECTOMY         Social History    reports that she has never smoked. She has never been exposed to tobacco smoke. She has never used smokeless  tobacco. She reports that she does not currently use alcohol. She reports that she does not use drugs.   Social History[1]    Family History  Family History   Problem Relation Name Age of Onset    Diabetes Father Franco Hawk     Heart disease Father Franco Hawk     Hypertension Father Franco Hawk        Medications and Allergies     Medications  No outpatient medications have been marked as taking for the 5/8/25 encounter (Office Visit) with Rupesh Mantilla MD.       Allergies  Review of patient's allergies indicates:   Allergen Reactions    Cefaclor Hives       Physical Examination     Vitals:    05/08/25 1620   BP: 118/82   Pulse: 96     Physical Exam  Vitals reviewed.   Constitutional:       Appearance: Normal appearance.   HENT:      Head: Normocephalic and atraumatic.   Eyes:      Extraocular Movements: Extraocular movements intact.      Conjunctiva/sclera: Conjunctivae normal.      Pupils: Pupils are equal, round, and reactive to light.   Cardiovascular:      Rate and Rhythm: Normal rate and regular rhythm.      Heart sounds: Normal heart sounds.   Pulmonary:      Effort: Pulmonary effort is normal.      Breath sounds: Normal breath sounds.   Musculoskeletal:         General: Normal range of motion.      Cervical back: Normal range of motion.   Skin:     General: Skin is warm and dry.   Neurological:      General: No focal deficit present.      Mental Status: She is alert and oriented to person, place, and time.   Psychiatric:         Mood and Affect: Mood normal.         Behavior: Behavior normal.          Narrative  Performed by: KODIUSE  Test Reason : R00.2,    Vent. Rate : 095 BPM     Atrial Rate : 095 BPM     P-R Int : 138 ms          QRS Dur : 078 ms      QT Int : 350 ms       P-R-T Axes : 044 062 026 degrees     QTc Int : 439 ms    Normal sinus rhythm  Normal ECG  No previous ECGs available  Confirmed by Keenan Johnson MD ([id number]) on 10/20/2024 5:30:41 PM    Referred By: [email or web address]   SELF            Confirmed By:Keenan Johnson MD   Specimen Collected: 10/07/24 18:53 CDT Last Resulted: 10/20/24 17:30 CDT     Admission on 10/07/2024, Discharged on 10/07/2024   Component Date Value Ref Range Status    QRS Duration 10/07/2024 78  ms Final    OHS QTC Calculation 10/07/2024 439  ms Final    Sodium 10/07/2024 135 (L)  136 - 145 mmol/L Final    Potassium 10/07/2024 3.8  3.5 - 5.1 mmol/L Final    Chloride 10/07/2024 100  98 - 107 mmol/L Final    CO2 10/07/2024 25  21 - 32 mmol/L Final    Anion Gap 10/07/2024 14  7 - 16 mmol/L Final    Glucose 10/07/2024 91  74 - 106 mg/dL Final    BUN 10/07/2024 14  7 - 18 mg/dL Final    Creatinine 10/07/2024 1.12 (H)  0.55 - 1.02 mg/dL Final    BUN/Creatinine Ratio 10/07/2024 13  6 - 20 Final    Calcium 10/07/2024 9.6  8.5 - 10.1 mg/dL Final    Total Protein 10/07/2024 7.9  6.4 - 8.2 g/dL Final    Albumin 10/07/2024 3.9  3.5 - 5.0 g/dL Final    Globulin 10/07/2024 4.0  2.0 - 4.0 g/dL Final    A/G Ratio 10/07/2024 1.0   Final    Bilirubin, Total 10/07/2024 0.3  >0.0 - 1.2 mg/dL Final    Alk Phos 10/07/2024 84  37 - 98 U/L Final    ALT 10/07/2024 41  13 - 56 U/L Final    AST 10/07/2024 25  15 - 37 U/L Final    eGFR 10/07/2024 67  >=60 mL/min/1.73m2 Final    Magnesium 10/07/2024 1.9  1.7 - 2.3 mg/dL Final    HCG Qualitative, Urine 10/07/2024 Negative  Negative Final    TSH 10/07/2024 2.240  0.358 - 3.740 uIU/mL Final    WBC 10/07/2024 9.08  4.50 - 11.00 K/uL Final    RBC 10/07/2024 4.72  4.20 - 5.40 M/uL Final    Hemoglobin 10/07/2024 13.8  12.0 - 16.0 g/dL Final    Hematocrit 10/07/2024 40.6  38.0 - 47.0 % Final    MCV 10/07/2024 86.0  80.0 - 96.0 fL Final    MCH 10/07/2024 29.2  27.0 - 31.0 pg Final    MCHC 10/07/2024 34.0  32.0 - 36.0 g/dL Final    RDW 10/07/2024 12.4  11.5 - 14.5 % Final    Platelet Count 10/07/2024 322  150 - 400 K/uL Final    MPV 10/07/2024 10.1  9.4 - 12.4 fL Final    Neutrophils % 10/07/2024 58.4  53.0 - 65.0 % Final    Lymphocytes % 10/07/2024 30.3  27.0 -  41.0 % Final    Monocytes % 10/07/2024 7.2 (H)  2.0 - 6.0 % Final    Eosinophils % 10/07/2024 3.4  1.0 - 4.0 % Final    Basophils % 10/07/2024 0.4  0.0 - 1.0 % Final    Immature Granulocytes % 10/07/2024 0.3  0.0 - 0.4 % Final    nRBC, Auto 10/07/2024 0.0  <=0.0 % Final    Neutrophils, Abs 10/07/2024 5.30  1.80 - 7.70 K/uL Final    Lymphocytes, Absolute 10/07/2024 2.75  1.00 - 4.80 K/uL Final    Monocytes, Absolute 10/07/2024 0.65  0.00 - 0.80 K/uL Final    Eosinophils, Absolute 10/07/2024 0.31  0.00 - 0.50 K/uL Final    Basophils, Absolute 10/07/2024 0.04  0.00 - 0.20 K/uL Final    Immature Granulocytes, Absolute 10/07/2024 0.03  0.00 - 0.04 K/uL Final    nRBC, Absolute 10/07/2024 0.00  <=0.00 x10e3/uL Final    Diff Type 10/07/2024 Auto   Final       Assessment and Plan (including Health Maintenance)      Problem List  Smart Sets  Document Outside HM   :    Plan: will get some labs and refer to cardiology as requested        Health Maintenance Due   Topic Date Due    Hepatitis C Screening  Never done    Cervical Cancer Screening  Never done    Lipid Panel  Never done    HIV Screening  Never done    COVID-19 Vaccine (1 - 2024-25 season) Never done       Problem List Items Addressed This Visit    None  Visit Diagnoses         Palpitations  (Chronic)   -  Primary    Relevant Orders    TSH    CBC Auto Differential    Comprehensive Metabolic Panel    Ambulatory referral/consult to Cardiology      Stress  (Chronic)               Health Maintenance Topics with due status: Not Due       Topic Last Completion Date    TETANUS VACCINE 05/30/2023    RSV Vaccine (Age 60+ and Pregnant patients) Not Due       Future Appointments   Date Time Provider Department Center   5/22/2025  4:30 PM Rupesh Mantilla MD [facility]            Signature:  Rupesh Mantilla MD  [facility]  MEDICAL [facility] FAMILY [address] [address]  [phone]    Date of  encounter: 5/8/25         [1]   Social History  Tobacco Use    Smoking status: Never     Passive exposure: Never    Smokeless tobacco: Never   Substance Use Topics    Alcohol use: Not Currently    Drug use: Never

## 2025-05-09 ENCOUNTER — RESULTS FOLLOW-UP (OUTPATIENT)
Dept: FAMILY MEDICINE | Facility: CLINIC | Age: 35
End: 2025-05-09

## 2025-05-09 LAB
ALBUMIN SERPL BCP-MCNC: 4.3 G/DL (ref 3.5–5)
ALBUMIN/GLOB SERPL: 1.2 {RATIO}
ALP SERPL-CCNC: 62 U/L (ref 40–150)
ALT SERPL W P-5'-P-CCNC: 45 U/L
ANION GAP SERPL CALCULATED.3IONS-SCNC: 14 MMOL/L (ref 7–16)
AST SERPL W P-5'-P-CCNC: 36 U/L (ref 11–45)
BASOPHILS # BLD AUTO: 0.06 K/UL (ref 0–0.2)
BASOPHILS NFR BLD AUTO: 0.7 % (ref 0–1)
BILIRUB SERPL-MCNC: 0.3 MG/DL
BUN SERPL-MCNC: 15 MG/DL (ref 7–19)
BUN/CREAT SERPL: 19 (ref 6–20)
CALCIUM SERPL-MCNC: 9.6 MG/DL (ref 8.4–10.2)
CHLORIDE SERPL-SCNC: 106 MMOL/L (ref 98–107)
CO2 SERPL-SCNC: 22 MMOL/L (ref 22–29)
CREAT SERPL-MCNC: 0.81 MG/DL (ref 0.55–1.02)
DIFFERENTIAL METHOD BLD: ABNORMAL
EGFR (NO RACE VARIABLE) (RUSH/TITUS): 98 ML/MIN/1.73M2
EOSINOPHIL # BLD AUTO: 0.24 K/UL (ref 0–0.5)
EOSINOPHIL NFR BLD AUTO: 2.9 % (ref 1–4)
ERYTHROCYTE [DISTWIDTH] IN BLOOD BY AUTOMATED COUNT: 12.2 % (ref 11.5–14.5)
GLOBULIN SER-MCNC: 3.5 G/DL (ref 2–4)
GLUCOSE SERPL-MCNC: 85 MG/DL (ref 74–100)
HCT VFR BLD AUTO: 40.8 % (ref 38–47)
HGB BLD-MCNC: 13.8 G/DL (ref 12–16)
IMM GRANULOCYTES # BLD AUTO: 0.04 K/UL (ref 0–0.04)
IMM GRANULOCYTES NFR BLD: 0.5 % (ref 0–0.4)
LYMPHOCYTES # BLD AUTO: 3.04 K/UL (ref 1–4.8)
LYMPHOCYTES NFR BLD AUTO: 37.2 % (ref 27–41)
MCH RBC QN AUTO: 29.7 PG (ref 27–31)
MCHC RBC AUTO-ENTMCNC: 33.8 G/DL (ref 32–36)
MCV RBC AUTO: 87.7 FL (ref 80–96)
MONOCYTES # BLD AUTO: 0.55 K/UL (ref 0–0.8)
MONOCYTES NFR BLD AUTO: 6.7 % (ref 2–6)
MPC BLD CALC-MCNC: 12.6 FL (ref 9.4–12.4)
NEUTROPHILS # BLD AUTO: 4.24 K/UL (ref 1.8–7.7)
NEUTROPHILS NFR BLD AUTO: 52 % (ref 53–65)
NRBC # BLD AUTO: 0 X10E3/UL
NRBC, AUTO (.00): 0 %
PLATELET # BLD AUTO: 319 K/UL (ref 150–400)
POTASSIUM SERPL-SCNC: 4.2 MMOL/L (ref 3.5–5.1)
PROT SERPL-MCNC: 7.8 G/DL (ref 6.4–8.3)
RBC # BLD AUTO: 4.65 M/UL (ref 4.2–5.4)
SODIUM SERPL-SCNC: 138 MMOL/L (ref 136–145)
TSH SERPL DL<=0.005 MIU/L-ACNC: 1.84 UIU/ML (ref 0.35–4.94)
WBC # BLD AUTO: 8.17 K/UL (ref 4.5–11)

## 2025-05-22 ENCOUNTER — OFFICE VISIT (OUTPATIENT)
Dept: FAMILY MEDICINE | Facility: CLINIC | Age: 35
End: 2025-05-22
Payer: MEDICAID

## 2025-05-22 VITALS
WEIGHT: 200.13 LBS | BODY MASS INDEX: 34.17 KG/M2 | HEIGHT: 64 IN | DIASTOLIC BLOOD PRESSURE: 77 MMHG | HEART RATE: 81 BPM | SYSTOLIC BLOOD PRESSURE: 113 MMHG | OXYGEN SATURATION: 98 %

## 2025-05-22 DIAGNOSIS — F43.9 STRESS: ICD-10-CM

## 2025-05-22 DIAGNOSIS — R00.2 PALPITATIONS: Primary | ICD-10-CM

## 2025-05-22 PROCEDURE — 3074F SYST BP LT 130 MM HG: CPT | Mod: CPTII,,, | Performed by: FAMILY MEDICINE

## 2025-05-22 PROCEDURE — 99213 OFFICE O/P EST LOW 20 MIN: CPT | Mod: ,,, | Performed by: FAMILY MEDICINE

## 2025-05-22 PROCEDURE — 3078F DIAST BP <80 MM HG: CPT | Mod: CPTII,,, | Performed by: FAMILY MEDICINE

## 2025-05-22 PROCEDURE — 1160F RVW MEDS BY RX/DR IN RCRD: CPT | Mod: CPTII,,, | Performed by: FAMILY MEDICINE

## 2025-05-22 PROCEDURE — 1159F MED LIST DOCD IN RCRD: CPT | Mod: CPTII,,, | Performed by: FAMILY MEDICINE

## 2025-05-22 PROCEDURE — 3008F BODY MASS INDEX DOCD: CPT | Mod: CPTII,,, | Performed by: FAMILY MEDICINE

## 2025-05-22 NOTE — PROGRESS NOTES
Rupesh Mantilla MD   [facility]  MEDICAL GROUP [facility] - FAMILY MEDICINE  [address] [place] [id number]  [phone]      PATIENT NAME: Eliza Hawk  : 1990  DATE: 25  MRN: [id number]      Billing Provider: Rupesh Mantilla MD  Level of Service: [place] OFFICE/OUTPT VISIT, EST, LEVL III, 20-29 MIN  Patient PCP Information       Provider PCP Type    Rupesh Mantilla MD General            Reason for Visit / Chief Complaint: Follow-up (Palpitations-  feeling about the same, no worse pt states)       Update PCP  Update Chief Complaint         History of Present Illness / Problem Focused Workflow     Eliza Hawk presents to the clinic with Follow-up (Palpitations-  feeling about the same, no worse pt states)       36 yo WF here for follow up palpitations.  She says that she is still having them  and are basically unchanged from previous.  She says that they are no worse than before and still denies any chest pain.  She says that she is still under a lot of stress.  Still does not consume any caffeine (stopped caffeine about 7 months ago).  Currently not taking any rx or otc meds.  Says that she gets adequete sleep.      Follow-up  Pertinent negatives include no abdominal pain, chest pain, chills, coughing, fever, sore throat or weakness.       Review of Systems     Review of Systems   Constitutional:  Negative for activity change, chills and fever.   HENT:  Negative for sore throat.    Eyes:  Negative for pain.   Respiratory:  Negative for cough, chest tightness and shortness of breath.    Cardiovascular:  Positive for palpitations. Negative for chest pain.   Gastrointestinal:  Negative for abdominal pain.   Neurological:  Negative for dizziness, syncope and weakness.   Psychiatric/Behavioral:  Negative for confusion.         Medical / Social / Family History     Past Medical History:   Diagnosis Date    Depression        Past Surgical History:   Procedure  Laterality Date    TONSILLECTOMY         Social History    reports that she has never smoked. She has never been exposed to tobacco smoke. She has never used smokeless tobacco. She reports that she does not currently use alcohol. She reports that she does not use drugs.   Social History[1]    Family History  Family History   Problem Relation Name Age of Onset    Diabetes Father Franco Hawk     Heart disease Father Franco Hawk     Hypertension Father Franco Hawk        Medications and Allergies     Medications  No outpatient medications have been marked as taking for the 5/22/25 encounter (Office Visit) with Rupesh Mantilla MD.       Allergies  Review of patient's allergies indicates:   Allergen Reactions    Cefaclor Hives       Physical Examination     Vitals:    05/22/25 1625   BP: 113/77   Pulse: 81     Physical Exam  Vitals reviewed.   Constitutional:       Appearance: Normal appearance.   HENT:      Head: Normocephalic and atraumatic.   Eyes:      Extraocular Movements: Extraocular movements intact.      Conjunctiva/sclera: Conjunctivae normal.      Pupils: Pupils are equal, round, and reactive to light.   Cardiovascular:      Rate and Rhythm: Normal rate and regular rhythm.      Heart sounds: Normal heart sounds.      Comments: Normal cardiac exam  Pulmonary:      Effort: Pulmonary effort is normal.      Breath sounds: Normal breath sounds.   Musculoskeletal:         General: Normal range of motion.      Cervical back: Normal range of motion.   Skin:     General: Skin is warm and dry.   Neurological:      General: No focal deficit present.      Mental Status: She is alert and oriented to person, place, and time.   Psychiatric:         Mood and Affect: Mood normal.         Behavior: Behavior normal.          Office Visit on 05/08/2025   Component Date Value Ref Range Status    TSH 05/08/2025 1.836  0.350 - 4.940 uIU/mL Final    Sodium 05/08/2025 138  136 - 145 mmol/L Final    Potassium 05/08/2025 4.2   3.5 - 5.1 mmol/L Final    Chloride 05/08/2025 106  98 - 107 mmol/L Final    CO2 05/08/2025 22  22 - 29 mmol/L Final    Anion Gap 05/08/2025 14  7 - 16 mmol/L Final    Glucose 05/08/2025 85  74 - 100 mg/dL Final    BUN 05/08/2025 15  7 - 19 mg/dL Final    Creatinine 05/08/2025 0.81  0.55 - 1.02 mg/dL Final    BUN/Creatinine Ratio 05/08/2025 19  6 - 20 Final    Calcium 05/08/2025 9.6  8.4 - 10.2 mg/dL Final    Total Protein 05/08/2025 7.8  6.4 - 8.3 g/dL Final    Albumin 05/08/2025 4.3  3.5 - 5.0 g/dL Final    Globulin 05/08/2025 3.5  2.0 - 4.0 g/dL Final    A/G Ratio 05/08/2025 1.2   Final    Bilirubin, Total 05/08/2025 0.3  <=1.5 mg/dL Final    Alk Phos 05/08/2025 62  40 - 150 U/L Final    ALT 05/08/2025 45  <=55 U/L Final    AST 05/08/2025 36  11 - 45 U/L Final    eGFR 05/08/2025 98  >=60 mL/min/1.73m2 Final    Estimated GFR calculated using the CKD-EPI creatinine (2021) equation.    WBC 05/08/2025 8.17  4.50 - 11.00 K/uL Final    RBC 05/08/2025 4.65  4.20 - 5.40 M/uL Final    Hemoglobin 05/08/2025 13.8  12.0 - 16.0 g/dL Final    Hematocrit 05/08/2025 40.8  38.0 - 47.0 % Final    MCV 05/08/2025 87.7  80.0 - 96.0 fL Final    MCH 05/08/2025 29.7  27.0 - 31.0 pg Final    MCHC 05/08/2025 33.8  32.0 - 36.0 g/dL Final    RDW 05/08/2025 12.2  11.5 - 14.5 % Final    Platelet Count 05/08/2025 319  150 - 400 K/uL Final    MPV 05/08/2025 12.6 (H)  9.4 - 12.4 fL Final    Neutrophils % 05/08/2025 52.0 (L)  53.0 - 65.0 % Final    Lymphocytes % 05/08/2025 37.2  27.0 - 41.0 % Final    Monocytes % 05/08/2025 6.7 (H)  2.0 - 6.0 % Final    Eosinophils % 05/08/2025 2.9  1.0 - 4.0 % Final    Basophils % 05/08/2025 0.7  0.0 - 1.0 % Final    Immature Granulocytes % 05/08/2025 0.5 (H)  0.0 - 0.4 % Final    nRBC, Auto 05/08/2025 0.0  <=0.0 % Final    Neutrophils, Abs 05/08/2025 4.24  1.80 - 7.70 K/uL Final    Lymphocytes, Absolute 05/08/2025 3.04  1.00 - 4.80 K/uL Final    Monocytes, Absolute 05/08/2025 0.55  0.00 - 0.80 K/uL Final     Eosinophils, Absolute 05/08/2025 0.24  0.00 - 0.50 K/uL Final    Basophils, Absolute 05/08/2025 0.06  0.00 - 0.20 K/uL Final    Immature Granulocytes, Absolute 05/08/2025 0.04  0.00 - 0.04 K/uL Final    nRBC, Absolute 05/08/2025 0.00  <=0.00 x10e3/uL Final    Diff Type 05/08/2025 Auto   Final         Assessment and Plan (including Health Maintenance)      Problem List  Smart Sets  Document Outside HM   :    Plan: recent lab results are normal and reviewed with patient.  She has follow up scheduled with cardiology on 06/02/25.        Health Maintenance Due   Topic Date Due    Hepatitis C Screening  Never done    Cervical Cancer Screening  Never done    Lipid Panel  Never done    HIV Screening  Never done    COVID-19 Vaccine (3 - 2024-25 season) 09/01/2024       Problem List Items Addressed This Visit    None  Visit Diagnoses         Palpitations    -  Primary      Stress                Health Maintenance Topics with due status: Not Due       Topic Last Completion Date    TETANUS VACCINE 05/30/2023    RSV Vaccine (Age 60+ and Pregnant patients) Not Due       Future Appointments   Date Time Provider Department Center   6/2/2025  9:15 AM Loco Batista DO OB [facility]            Signature:  MD BAYLEE Chavez [facility]  MEDICAL GROUP [facility] - FAMILY MEDICINE  [address] [place] [id number]  [phone]    Date of encounter: 5/22/25         [1]   Social History  Tobacco Use    Smoking status: Never     Passive exposure: Never    Smokeless tobacco: Never   Substance Use Topics    Alcohol use: Not Currently    Drug use: Never

## 2025-06-02 ENCOUNTER — HOSPITAL ENCOUNTER (OUTPATIENT)
Dept: CARDIOLOGY | Facility: HOSPITAL | Age: 35
Discharge: HOME | End: 2025-06-02
Attending: INTERNAL MEDICINE
Payer: MEDICAID

## 2025-06-02 ENCOUNTER — OFFICE VISIT (OUTPATIENT)
Dept: CARDIOLOGY | Facility: CLINIC | Age: 35
End: 2025-06-02

## 2025-06-02 VITALS
WEIGHT: 202 LBS | HEIGHT: 64 IN | SYSTOLIC BLOOD PRESSURE: 104 MMHG | OXYGEN SATURATION: 98 % | DIASTOLIC BLOOD PRESSURE: 78 MMHG | BODY MASS INDEX: 34.49 KG/M2 | HEART RATE: 86 BPM

## 2025-06-02 DIAGNOSIS — R00.2 PALPITATIONS: Primary | ICD-10-CM

## 2025-06-02 DIAGNOSIS — R06.02 SHORTNESS OF BREATH: ICD-10-CM

## 2025-06-02 DIAGNOSIS — R00.2 PALPITATIONS: ICD-10-CM

## 2025-06-02 PROCEDURE — 93246 EXT ECG>7D<15D RECORDING: CPT

## 2025-06-02 PROCEDURE — 93010 ELECTROCARDIOGRAM REPORT: CPT | Mod: S$PBB,,, | Performed by: INTERNAL MEDICINE

## 2025-06-02 PROCEDURE — 99214 OFFICE O/P EST MOD 30 MIN: CPT | Mod: PBBFAC,25 | Performed by: INTERNAL MEDICINE

## 2025-06-02 PROCEDURE — 93005 ELECTROCARDIOGRAM TRACING: CPT | Mod: PBBFAC | Performed by: INTERNAL MEDICINE

## 2025-06-02 PROCEDURE — 99999 PR PBB SHADOW E&M-EST. PATIENT-LVL IV: CPT | Mod: PBBFAC,,, | Performed by: INTERNAL MEDICINE

## 2025-06-02 NOTE — PATIENT INSTRUCTIONS
Daniel placed at today's visit.   Echo and stress test are scheduled for June 19th at 8:00.  Follow up with  on July 15th at 9:00 to review  test results.

## 2025-06-02 NOTE — PROGRESS NOTES
PCP: Rupesh Mantilla MD    Referring Provider:     Subjective:   Eliza Hawk is a 35 y.o. female with hx of previous depression and anxiety who presents for palpitations and dyspnea. First noticed these at least 10 years ago when she started taking some antidepressants but does not remember the name. Did not have these episodes reoccur until this October. This October of this year she noticed the palpations more frequently when drinking large amounts of caffeine mainly sweet tea. Since then she has stopped consuming caffeine and has not had a significant improvement. She states these palpitations only occur at rest or laying down and are occur almost ever day. They are typically alleviated with exercise and last 3-5 minutes in duration.  The same goes for dyspnea and she has noticed they only when she is sitting. Denies pain just feels like she needs to catch her breath. It doesn't occur with activity and is alleviated when she takes a moment to rest and takes deep breaths.    She sometimes has pain in right shoulder denies it being achey sharp or dull. She feels like it is MSK related. Reports no sharp pain, crushing pain, or central pain attributes it to not sleeping well.                 Fhx:  Family History   Problem Relation Name Age of Onset    Atrial fibrillation Mother      Diabetes Father Franco Hawk     Heart disease Father Franco Hawk     Hypertension Father Franco Hawk       Shx:   Past Surgical History:   Procedure Laterality Date    TONSILLECTOMY         EKG - Status: Final result       Next appt: 08/01/2025 at 09:00 AM in Cardiology ([facility] STRESS)       Dx: Palpitations    Test Result Released: Yes (seen)    0 Result Notes       Component  Ref Range & Units (hover) 3 wk ago 8 mo ago   QRS Duration 72 78   OHS QTC Calculation 408 439   Resulting Agency [facility]EM[facility] GEMUSE              Narrative  Performed by: GEMUSE  Test Reason : R00.2,    Vent. Rate :  80 BPM     Atrial Rate :  80 BPM      "P-R Int : 142 ms          QRS Dur :  72 ms      QT Int : 354 ms       P-R-T Axes :  36  60   9 degrees    QTcB Int : 408 ms    Normal sinus rhythm  Normal ECG  When compared with ECG of 07-Oct-2024 18:53,  No significant change was found  Confirmed by Loco Batista ([id number]) on 6/10/2025 12:25:40 PM       ECHO - No results found for this or any previous visit.       CATH - No results found for this or any previous visit.       Stress - No results found for this or any previous visit.       Lab Results   Component Value Date     05/08/2025    K 4.2 05/08/2025     05/08/2025    CO2 22 05/08/2025    BUN 15 05/08/2025    CREATININE 0.81 05/08/2025    CALCIUM 9.6 05/08/2025    ANIONGAP 14 05/08/2025       No results found for: "CHOL"  No results found for: "HDL"  No results found for: "LDLCALC"  No results found for: "TRIG"  No results found for: "CHOLHDL"    Lab Results   Component Value Date    WBC 8.17 05/08/2025    HGB 13.8 05/08/2025    HCT 40.8 05/08/2025    MCV 87.7 05/08/2025     05/08/2025         Current Medications[1]  No current outpatient medications on file.   Review of Systems   Constitutional: Negative for chills, fever, night sweats, weight gain and weight loss.   HENT:  Negative for ear discharge, ear pain and hearing loss.    Eyes:  Negative for blurred vision and double vision.   Cardiovascular:  Positive for palpitations. Negative for chest pain, leg swelling, near-syncope and syncope.   Respiratory:  Positive for shortness of breath. Negative for cough, snoring and wheezing.    Endocrine: Negative for cold intolerance and heat intolerance.   Skin:  Negative for color change and nail changes.   Musculoskeletal:  Positive for arthritis. Negative for back pain and joint pain.   Gastrointestinal:  Negative for bloating, change in bowel habit, bowel incontinence, constipation, diarrhea and melena.   Genitourinary:  Negative for flank pain and frequency.          Objective:   /78 (BP " "Location: Left arm, Patient Position: Sitting)   Pulse 86   Ht 5' 4" (1.626 m)   Wt 91.6 kg (202 lb)   SpO2 98%   BMI 34.67 kg/m²       Physical Exam  Vitals and nursing note reviewed.   Constitutional:       Appearance: Normal appearance.   HENT:      Head: Normocephalic and atraumatic.      Right Ear: External ear normal.      Left Ear: External ear normal.   Eyes:      General: No scleral icterus.        Right eye: No discharge.         Left eye: No discharge.      Extraocular Movements: Extraocular movements intact.      Conjunctiva/sclera: Conjunctivae normal.      Pupils: Pupils are equal, round, and reactive to light.   Cardiovascular:      Rate and Rhythm: Normal rate and regular rhythm.      Pulses: Normal pulses.      Heart sounds: Normal heart sounds. No murmur heard.     No friction rub. No gallop.   Pulmonary:      Effort: Pulmonary effort is normal.      Breath sounds: Normal breath sounds. No wheezing, rhonchi or rales.   Chest:      Chest wall: No tenderness.   Abdominal:      General: Abdomen is flat. Bowel sounds are normal. There is no distension.      Palpations: Abdomen is soft.      Tenderness: There is no abdominal tenderness. There is no guarding or rebound.   Musculoskeletal:         General: No swelling or tenderness. Normal range of motion.      Cervical back: Normal range of motion and neck supple.   Skin:     General: Skin is warm and dry.      Findings: No erythema or rash.   Neurological:      General: No focal deficit present.      Mental Status: She is alert and oriented to person, place, and time.      Cranial Nerves: No cranial nerve deficit.      Motor: No weakness.      Gait: Gait normal.   Psychiatric:         Mood and Affect: Mood normal.         Behavior: Behavior normal.         Thought Content: Thought content normal.         Judgment: Judgment normal.         Assessment:     1. Palpitations  EKG 12-lead    Ambulatory referral/consult to Cardiology    EKG 12-lead    " Cardiac Monitor - 3-15 Day Adult    Echo    Exercise Stress - EKG    unclear etiology, will order echo and outpatient tele to evaluate for arrhythmis, stuctural heart disease      2. Shortness of breath      SOB at rest somewhat atypical, order treadmill stress test to evaluate chronotropic competence.            Plan:   Outpatient tele to monitor arrythmogenic source of palpitations, echo to eval structural heart disease, and treadmill stress to evaluate chronotropic competence.               [1] No current outpatient medications on file.

## 2025-06-10 LAB
OHS QRS DURATION: 72 MS
OHS QTC CALCULATION: 408 MS

## 2025-06-17 ENCOUNTER — TELEPHONE (OUTPATIENT)
Dept: CARDIOLOGY | Facility: CLINIC | Age: 35
End: 2025-06-17
Payer: MEDICAID

## 2025-06-17 ENCOUNTER — TELEPHONE (OUTPATIENT)
Dept: CARDIOLOGY | Facility: HOSPITAL | Age: 35
End: 2025-06-17
Payer: MEDICAID

## 2025-06-17 NOTE — TELEPHONE ENCOUNTER
Copied from CRM #[id number]. Topic: General Inquiry - Patient Advice  >> Jun 16, 2025  3:39 PM Carolyn wrote:  Who Called: Eliza Hawk    Caller is requesting assistance/information from provider's office.    Pt is needing to reschedule her tests that were scheduled for 06/19, pt is needing to reschedule for the first week of August.     Preferred Method of Contact: Phone Call  Patient's Preferred Phone Number on File: [phone]   Best Call Back Number, if different:  Additional Information:    Spoke with  with heart station. Informed her that the patient would like to r/s her tests to the first week of August.  verbalized understanding and informed me that she will give the patient a call with a new date and time. I verbalized understanding.     Called the patient to inform her of this, but it went to her VM. Left the patient a VM informing her that she should be expecting a call.     Patient's tests are scheduled for August 1st at 9:00 and her follow up with  has been rescheduled to August 13th at 10:15.

## 2025-07-14 ENCOUNTER — HOSPITAL ENCOUNTER (EMERGENCY)
Facility: HOSPITAL | Age: 35
Discharge: HOME | End: 2025-07-14
Payer: MEDICAID

## 2025-07-14 VITALS
WEIGHT: 200 LBS | RESPIRATION RATE: 14 BRPM | TEMPERATURE: 98 F | HEART RATE: 77 BPM | BODY MASS INDEX: 33.32 KG/M2 | OXYGEN SATURATION: 95 % | SYSTOLIC BLOOD PRESSURE: 130 MMHG | HEIGHT: 65 IN | DIASTOLIC BLOOD PRESSURE: 79 MMHG

## 2025-07-14 DIAGNOSIS — R00.2 PALPITATIONS: ICD-10-CM

## 2025-07-14 DIAGNOSIS — F41.9 ANXIETY: Primary | ICD-10-CM

## 2025-07-14 LAB
ALBUMIN SERPL BCP-MCNC: 4.3 G/DL (ref 3.5–5)
ALBUMIN/GLOB SERPL: 1.6 {RATIO}
ALP SERPL-CCNC: 62 U/L (ref 40–150)
ALT SERPL W P-5'-P-CCNC: 23 U/L
ANION GAP SERPL CALCULATED.3IONS-SCNC: 15 MMOL/L (ref 7–16)
AST SERPL W P-5'-P-CCNC: 23 U/L (ref 11–45)
BASOPHILS # BLD AUTO: 0.02 K/UL (ref 0–0.2)
BASOPHILS NFR BLD AUTO: 0.3 % (ref 0–1)
BILIRUB SERPL-MCNC: 0.5 MG/DL
BUN SERPL-MCNC: 12 MG/DL (ref 7–19)
BUN/CREAT SERPL: 13 (ref 6–20)
CALCIUM SERPL-MCNC: 10 MG/DL (ref 8.4–10.2)
CHLORIDE SERPL-SCNC: 106 MMOL/L (ref 98–107)
CO2 SERPL-SCNC: 22 MMOL/L (ref 22–29)
CREAT SERPL-MCNC: 0.91 MG/DL (ref 0.55–1.02)
DIFFERENTIAL METHOD BLD: ABNORMAL
EGFR (NO RACE VARIABLE) (RUSH/TITUS): 86 ML/MIN/1.73M2
EOSINOPHIL # BLD AUTO: 0.26 K/UL (ref 0–0.5)
EOSINOPHIL NFR BLD AUTO: 3.5 % (ref 1–4)
ERYTHROCYTE [DISTWIDTH] IN BLOOD BY AUTOMATED COUNT: 12.2 % (ref 11.5–14.5)
GLOBULIN SER-MCNC: 2.7 G/DL (ref 2–4)
GLUCOSE SERPL-MCNC: 82 MG/DL (ref 74–100)
HCT VFR BLD AUTO: 39.6 % (ref 38–47)
HGB BLD-MCNC: 14 G/DL (ref 12–16)
IMM GRANULOCYTES # BLD AUTO: 0.01 K/UL (ref 0–0.04)
IMM GRANULOCYTES NFR BLD: 0.1 % (ref 0–0.4)
LYMPHOCYTES # BLD AUTO: 2.69 K/UL (ref 1–4.8)
LYMPHOCYTES NFR BLD AUTO: 36.5 % (ref 27–41)
MCH RBC QN AUTO: 29.9 PG (ref 27–31)
MCHC RBC AUTO-ENTMCNC: 35.4 G/DL (ref 32–36)
MCV RBC AUTO: 84.4 FL (ref 80–96)
MONOCYTES # BLD AUTO: 0.47 K/UL (ref 0–0.8)
MONOCYTES NFR BLD AUTO: 6.4 % (ref 2–6)
MPC BLD CALC-MCNC: 11.3 FL (ref 9.4–12.4)
NEUTROPHILS # BLD AUTO: 3.91 K/UL (ref 1.8–7.7)
NEUTROPHILS NFR BLD AUTO: 53.2 % (ref 53–65)
NRBC # BLD AUTO: 0 X10E3/UL
NRBC, AUTO (.00): 0 %
PLATELET # BLD AUTO: 306 K/UL (ref 150–400)
POTASSIUM SERPL-SCNC: 3.9 MMOL/L (ref 3.5–5.1)
PROT SERPL-MCNC: 7 G/DL (ref 6.4–8.3)
RBC # BLD AUTO: 4.69 M/UL (ref 4.2–5.4)
SODIUM SERPL-SCNC: 139 MMOL/L (ref 136–145)
TROPONIN I SERPL HS-MCNC: <2.7 NG/L
WBC # BLD AUTO: 7.36 K/UL (ref 4.5–11)

## 2025-07-14 PROCEDURE — 93010 ELECTROCARDIOGRAM REPORT: CPT | Mod: ,,, | Performed by: HOSPITALIST

## 2025-07-14 PROCEDURE — 84484 ASSAY OF TROPONIN QUANT: CPT | Performed by: NURSE PRACTITIONER

## 2025-07-14 PROCEDURE — 99284 EMERGENCY DEPT VISIT MOD MDM: CPT | Mod: ,,, | Performed by: NURSE PRACTITIONER

## 2025-07-14 PROCEDURE — 80053 COMPREHEN METABOLIC PANEL: CPT | Performed by: NURSE PRACTITIONER

## 2025-07-14 PROCEDURE — 85025 COMPLETE CBC W/AUTO DIFF WBC: CPT | Performed by: NURSE PRACTITIONER

## 2025-07-14 PROCEDURE — 93005 ELECTROCARDIOGRAM TRACING: CPT

## 2025-07-14 NOTE — ED TRIAGE NOTES
Pt presents to the ER accompanied by her 3 children c/o palpitations and right arm pain.  States she is a cardiology pt by Dr Batista that is awaiting an echo at [facility] this [facility]. Denies caffeine or any stimulants. States she thinks this could be anxiety related.

## 2025-07-14 NOTE — ED PROVIDER NOTES
Encounter Date: 7/14/2025       History     Chief Complaint   Patient presents with    Palpitations     Patient  presents to the ED with complaints of palpitations and right arm pain, states she has been having the symptoms all day today. States she recently had a holter monitor last month and has a scheduled ECHO and stress test but states cardiology has told her that it is most likely anxiety causing her symptoms. Patient reports she has been having these episodes intermittently for about a year now.     The history is provided by the patient.     Review of patient's allergies indicates:   Allergen Reactions    Cefaclor Hives     Past Medical History:   Diagnosis Date    Depression      Past Surgical History:   Procedure Laterality Date    TONSILLECTOMY       Family History   Problem Relation Name Age of Onset    Atrial fibrillation Mother      Diabetes Father Franco Hawk     Heart disease Father Franco Hawk     Hypertension Father Franco Hawk      Social History[1]  Review of Systems   Constitutional: Negative.    Respiratory: Negative.     Cardiovascular:  Positive for palpitations.   Musculoskeletal:         Right arm pain   Neurological: Negative.    Psychiatric/Behavioral: Negative.     All other systems reviewed and are negative.      Physical Exam     Initial Vitals [07/14/25 1724]   BP Pulse Resp Temp SpO2   136/89 95 20 97.7 °F (36.5 °C) 98 %      MAP       --         Physical Exam    Vitals reviewed.  Constitutional: She appears well-developed and well-nourished.   Cardiovascular:  Normal rate, regular rhythm, normal heart sounds and intact distal pulses.           Pulmonary/Chest: Breath sounds normal.   Musculoskeletal:         General: Normal range of motion.     Neurological: She is alert and oriented to person, place, and time. She has normal strength. GCS score is 15. GCS eye subscore is 4. GCS verbal subscore is 5. GCS motor subscore is 6.   Skin: Skin is warm and dry. Capillary refill  takes less than 2 seconds.   Psychiatric: She has a normal mood and affect. Her behavior is normal. Judgment and thought content normal.         Medical Screening Exam   See Full Note    ED Course   Procedures  Labs Reviewed   CBC WITH DIFFERENTIAL - Abnormal       Result Value    WBC 7.36      RBC 4.69      Hemoglobin 14.0      Hematocrit 39.6      MCV 84.4      MCH 29.9      MCHC 35.4      RDW 12.2      Platelet Count 306      MPV 11.3      Neutrophils % 53.2      Lymphocytes % 36.5      Monocytes % 6.4 (*)     Eosinophils % 3.5      Basophils % 0.3      Immature Granulocytes % 0.1      nRBC, Auto 0.0      Neutrophils, Abs 3.91      Lymphocytes, Absolute 2.69      Monocytes, Absolute 0.47      Eosinophils, Absolute 0.26      Basophils, Absolute 0.02      Immature Granulocytes, Absolute 0.01      nRBC, Absolute 0.00      Diff Type Auto     TROPONIN I - Normal    Troponin I High Sensitivity <2.7     CBC W/ AUTO DIFFERENTIAL    Narrative:     The following orders were created for panel order CBC auto differential.  Procedure                               Abnormality         Status                     ---------                               -----------         ------                     CBC with Differential[[id number]]       Abnormal            Final result                 Please view results for these tests on the individual orders.   COMPREHENSIVE METABOLIC PANEL    Sodium 139      Potassium 3.9      Chloride 106      CO2 22      Anion Gap 15      Glucose 82      BUN 12      Creatinine 0.91      BUN/Creatinine Ratio 13      Calcium 10.0      Total Protein 7.0      Albumin 4.3      Globulin 2.7      A/G Ratio 1.6      Bilirubin, Total 0.5      Alk Phos 62      ALT 23      AST 23      eGFR 86            Imaging Results    None          Medications - No data to display  Medical Decision Making  MDM    Patient presents for emergent evaluation of acute palpitations with right arm pain that poses a threat to life and/or  bodily function.    In the ED patient found to have acute anxiety.    I ordered labs and personally reviewed them.  Labs significant for WBC 7.36, H/H 14.0/39.6, Platelets 306, Na  139, K 3.9, Cl 106, BUN 12, Creat 0.91, Troponin <2.7  I ordered EKG and personally reviewed it.  EKG significant for NSR, rate of 84.      Discharge MDM  I discussed the treatment and discharge plan with the patient.   Patient was discharged in stable condition.  Detailed return precautions discussed.    Amount and/or Complexity of Data Reviewed  Labs: ordered.                                      Clinical Impression:   Final diagnoses:  [R00.2] Palpitations  [F41.9] Anxiety (Primary)        ED Disposition Condition    Discharge Stable          ED Prescriptions    None       Follow-up Information    None            [1]   Social History  Tobacco Use    Smoking status: Never     Passive exposure: Never    Smokeless tobacco: Never   Substance Use Topics    Alcohol use: Not Currently    Drug use: Never        Edita Finn, [facility]  07/14/25 6748

## 2025-07-15 LAB
OHS QRS DURATION: 76 MS
OHS QTC CALCULATION: 444 MS

## 2025-07-18 ENCOUNTER — OFFICE VISIT (OUTPATIENT)
Dept: FAMILY MEDICINE | Facility: CLINIC | Age: 35
End: 2025-07-18
Payer: MEDICAID

## 2025-07-18 VITALS
WEIGHT: 195.38 LBS | SYSTOLIC BLOOD PRESSURE: 123 MMHG | BODY MASS INDEX: 32.55 KG/M2 | HEART RATE: 84 BPM | DIASTOLIC BLOOD PRESSURE: 65 MMHG | OXYGEN SATURATION: 98 % | HEIGHT: 65 IN

## 2025-07-18 DIAGNOSIS — R00.2 PALPITATIONS: Chronic | ICD-10-CM

## 2025-07-18 DIAGNOSIS — F41.9 ANXIETY: Chronic | ICD-10-CM

## 2025-07-18 DIAGNOSIS — F43.9 STRESS: Primary | Chronic | ICD-10-CM

## 2025-07-18 RX ORDER — SERTRALINE HYDROCHLORIDE 25 MG/1
25 TABLET, FILM COATED ORAL DAILY
Qty: 30 TABLET | Refills: 11 | Status: SHIPPED | OUTPATIENT
Start: 2025-07-18 | End: 2026-07-18

## 2025-07-18 NOTE — PROGRESS NOTES
Rupesh Mantilla MD   [facility]  [facility]ICAL [facility]  [address] [place] [id number]  [phone]      PATIENT NAME: Eliza Hawk  : 1990  DATE: 25  MRN: [id number]      Billing Provider: Rupesh Mantilla MD  Level of Service:   Patient PCP Information       Provider PCP Type    Rupesh Mantilla MD General            Reason for Visit / Chief Complaint: Arm Pain (Right arm pain) and Palpitations       Update PCP  Update Chief Complaint         History of Present Illness / Problem Focused Workflow     Eliza Hawk presents to the clinic with Arm Pain (Right arm pain) and Palpitations       Follow up from ED for palpitations and RUE pain.  Workup was unremarkable.  She has seen cardiology and has had negative workup to date.  She is scheduled for stress test and echo.  Has cardiology clinic follow up scheduled.  To date, her symptoms are thought to be stress related.  She does admit to being under a lot of ongoing stress recently.    I have reviewed her ED and cardiology encounters and associated labs and imaging.    Review of Systems     Review of Systems   Respiratory:  Negative for shortness of breath.    Cardiovascular:  Positive for palpitations. Negative for chest pain and leg swelling.   Neurological:  Negative for dizziness, syncope and weakness.      Medical / Social / Family History     Past Medical History:   Diagnosis Date    Depression        Past Surgical History:   Procedure Laterality Date    TONSILLECTOMY         Social History    reports that she has never smoked. She has never been exposed to tobacco smoke. She has never used smokeless tobacco. She reports that she does not currently use alcohol. She reports that she does not use drugs.   Social History[1]    Family History  Family History   Problem Relation Name Age of Onset    Atrial fibrillation Mother      Diabetes Father Franco Carine     Heart disease Father  Franco Hawk     Hypertension Father Franco Hawk        Medications and Allergies     Medications  No outpatient medications have been marked as taking for the 7/18/25 encounter (Office Visit) with Rupesh Mantilla MD.       Allergies  Review of patient's allergies indicates:   Allergen Reactions    Cefaclor Hives       Physical Examination     Vitals:    07/18/25 1641   BP: 123/65   Pulse: 84     Physical Exam  Vitals reviewed.   Constitutional:       Appearance: Normal appearance.   HENT:      Head: Normocephalic and atraumatic.   Eyes:      Extraocular Movements: Extraocular movements intact.      Conjunctiva/sclera: Conjunctivae normal.      Pupils: Pupils are equal, round, and reactive to light.   Cardiovascular:      Rate and Rhythm: Normal rate and regular rhythm.      Heart sounds: Normal heart sounds.   Pulmonary:      Effort: Pulmonary effort is normal.      Breath sounds: Normal breath sounds.   Musculoskeletal:         General: Normal range of motion.      Cervical back: Normal range of motion.   Skin:     General: Skin is warm and dry.   Neurological:      General: No focal deficit present.      Mental Status: She is alert and oriented to person, place, and time.   Psychiatric:         Mood and Affect: Mood normal.         Behavior: Behavior normal.        Admission on 07/14/2025, Discharged on 07/14/2025   Component Date Value Ref Range Status    Sodium 07/14/2025 139  136 - 145 mmol/L Final    Potassium 07/14/2025 3.9  3.5 - 5.1 mmol/L Final    Chloride 07/14/2025 106  98 - 107 mmol/L Final    CO2 07/14/2025 22  22 - 29 mmol/L Final    Anion Gap 07/14/2025 15  7 - 16 mmol/L Final    Glucose 07/14/2025 82  74 - 100 mg/dL Final    BUN 07/14/2025 12  7 - 19 mg/dL Final    Creatinine 07/14/2025 0.91  0.55 - 1.02 mg/dL Final    BUN/Creatinine Ratio 07/14/2025 13  6 - 20 Final    Calcium 07/14/2025 10.0  8.4 - 10.2 mg/dL Final    Total Protein 07/14/2025 7.0  6.4 - 8.3 g/dL Final     Albumin 07/14/2025 4.3  3.5 - 5.0 g/dL Final    Globulin 07/14/2025 2.7  2.0 - 4.0 g/dL Final    A/G Ratio 07/14/2025 1.6   Final    Bilirubin, Total 07/14/2025 0.5  <=1.5 mg/dL Final    Alk Phos 07/14/2025 62  40 - 150 U/L Final    ALT 07/14/2025 23  <=55 U/L Final    AST 07/14/2025 23  11 - 45 U/L Final    eGFR 07/14/2025 86  >=60 mL/min/1.73m2 Final    Estimated GFR calculated using the CKD-EPI creatinine (2021) equation.    Troponin I High Sensitivity 07/14/2025 <2.7  <=14.0 ng/L Final    QRS Duration 07/14/2025 76  ms Final    OHS QTC Calculation 07/14/2025 444  ms Final    WBC 07/14/2025 7.36  4.50 - 11.00 K/uL Final    RBC 07/14/2025 4.69  4.20 - 5.40 M/uL Final    Hemoglobin 07/14/2025 14.0  12.0 - 16.0 g/dL Final    Hematocrit 07/14/2025 39.6  38.0 - 47.0 % Final    MCV 07/14/2025 84.4  80.0 - 96.0 fL Final    MCH 07/14/2025 29.9  27.0 - 31.0 pg Final    MCHC 07/14/2025 35.4  32.0 - 36.0 g/dL Final    RDW 07/14/2025 12.2  11.5 - 14.5 % Final    Platelet Count 07/14/2025 306  150 - 400 K/uL Final    MPV 07/14/2025 11.3  9.4 - 12.4 fL Final    Neutrophils % 07/14/2025 53.2  53.0 - 65.0 % Final    Lymphocytes % 07/14/2025 36.5  27.0 - 41.0 % Final    Monocytes % 07/14/2025 6.4 (H)  2.0 - 6.0 % Final    Eosinophils % 07/14/2025 3.5  1.0 - 4.0 % Final    Basophils % 07/14/2025 0.3  0.0 - 1.0 % Final    Immature Granulocytes % 07/14/2025 0.1  0.0 - 0.4 % Final    nRBC, Auto 07/14/2025 0.0  <=0.0 % Final    Neutrophils, Abs 07/14/2025 3.91  1.80 - 7.70 K/uL Final    Lymphocytes, Absolute 07/14/2025 2.69  1.00 - 4.80 K/uL Final    Monocytes, Absolute 07/14/2025 0.47  0.00 - 0.80 K/uL Final    Eosinophils, Absolute 07/14/2025 0.26  0.00 - 0.50 K/uL Final    Basophils, Absolute 07/14/2025 0.02  0.00 - 0.20 K/uL Final    Immature Granulocytes, Absolute 07/14/2025 0.01  0.00 - 0.04 K/uL Final    nRBC, Absolute 07/14/2025 0.00  <=0.00 x10e3/uL Final    Diff Type 07/14/2025 Auto   Final          Assessment and Plan (including Health Maintenance)      Problem List  Smart Sets  Document Outside HM   :    Plan:         Health Maintenance Due   Topic Date Due    Hepatitis C Screening  Never done    Cervical Cancer Screening  Never done    Lipid Panel  Never done    HIV Screening  Never done    COVID-19 Vaccine (3 - 2024-25 season) 09/01/2024       Problem List Items Addressed This Visit    None  Visit Diagnoses         Palpitations    -  Primary    Relevant Medications    sertraline (ZOLOFT) 25 MG tablet      Stress        Relevant Medications    sertraline (ZOLOFT) 25 MG tablet      Anxiety        Relevant Medications    sertraline (ZOLOFT) 25 MG tablet            Health Maintenance Topics with due status: Not Due       Topic Last Completion Date    TETANUS VACCINE 05/30/2023    Influenza Vaccine 10/09/2024    RSV Vaccine (Age 60+ and Pregnant patients) Not Due       Future Appointments   Date Time Provider Department Center   8/1/2025  9:00 AM [facility]   8/1/2025 10:00 [facility]   8/8/2025  4:30 PM [facility]   8/13/2025 10:15 AM Loco Batista DO [facility]            Signature:  Rupesh Mantilla MD  [facility]  MEDICAL GROUP [facility]  [address] [place] [id number]  [phone]    Date of encounter: 7/18/25           [1]  Social History  Tobacco Use    Smoking status: Never     Passive exposure: Never    Smokeless tobacco: Never   Substance Use Topics    Alcohol use: Not Currently    Drug use: Never

## 2025-07-30 ENCOUNTER — HOSPITAL ENCOUNTER (EMERGENCY)
Facility: HOSPITAL | Age: 35
Discharge: HOME | End: 2025-07-30

## 2025-07-30 VITALS
DIASTOLIC BLOOD PRESSURE: 77 MMHG | SYSTOLIC BLOOD PRESSURE: 127 MMHG | OXYGEN SATURATION: 97 % | TEMPERATURE: 98 F | RESPIRATION RATE: 18 BRPM | HEIGHT: 65 IN | BODY MASS INDEX: 32.49 KG/M2 | HEART RATE: 89 BPM | WEIGHT: 195 LBS

## 2025-07-30 DIAGNOSIS — S00.83XA CONTUSION OF FACE, INITIAL ENCOUNTER: Primary | ICD-10-CM

## 2025-07-30 PROCEDURE — 99282 EMERGENCY DEPT VISIT SF MDM: CPT | Mod: ,,, | Performed by: NURSE PRACTITIONER

## 2025-07-30 PROCEDURE — 99284 EMERGENCY DEPT VISIT MOD MDM: CPT | Mod: 25

## 2025-07-30 NOTE — ED PROVIDER NOTES
Encounter Date: 7/30/2025       History     Chief Complaint   Patient presents with    Head Injury     Opened her car door and hit her head on the corner of the door about 0915 this am, reports she had a headache to the right side this morning before that but now has pain more to the left side including her left ear, denies any change in LOC     Pt reports she was opening her car door when she struck herself in the forehead. Denies loc.  Reports left sided headache and left ear pain since injury. Denies vision changes, n/v, dizziness.     The history is provided by the patient.   Head Injury   The incident occurred 2 to 3 hours ago. She came to the ER via by private vehicle. The injury mechanism was a direct blow. There was no loss of consciousness. There was no blood loss. The quality of the pain is described as throbbing. The pain has been constant since the injury. Pertinent negatives include no numbness, no blurred vision, no vomiting, no tinnitus, no disorientation, no weakness and no memory loss.     Review of patient's allergies indicates:   Allergen Reactions    Cefaclor Hives     Past Medical History:   Diagnosis Date    Depression      Past Surgical History:   Procedure Laterality Date    TONSILLECTOMY       Family History   Problem Relation Name Age of Onset    Atrial fibrillation Mother      Diabetes Father Franco Hawk     Heart disease Father Franco Hawk     Hypertension Father Franco Hawk      Social History[1]  Review of Systems   Constitutional:  Negative for fever.   HENT:  Positive for ear pain. Negative for sore throat and tinnitus.    Eyes:  Negative for blurred vision.   Respiratory:  Negative for shortness of breath.    Cardiovascular:  Negative for chest pain.   Gastrointestinal:  Negative for nausea and vomiting.   Genitourinary:  Negative for dysuria.   Musculoskeletal:  Negative for back pain.   Skin:  Negative for rash.   Neurological:  Positive for headaches. Negative for  dizziness, tremors, seizures, syncope, facial asymmetry, speech difficulty, weakness, light-headedness and numbness.   Hematological:  Does not bruise/bleed easily.   Psychiatric/Behavioral:  Negative for memory loss.        Physical Exam     Initial Vitals [07/30/25 1349]   BP Pulse Resp Temp SpO2   (!) 144/93 95 18 98.2 °F (36.8 °C) 99 %      MAP       --         Physical Exam    Nursing note and vitals reviewed.  Constitutional: She appears well-developed and well-nourished. She is not diaphoretic. She is cooperative.  Non-toxic appearance. She does not have a sickly appearance. She does not appear ill. No distress.   HENT:   Head: Normocephalic.       Eyes: Pupils are equal, round, and reactive to light.   Neck: Neck supple.   Cardiovascular:  Normal rate, regular rhythm, normal heart sounds and intact distal pulses.     Exam reveals no gallop and no friction rub.       No murmur heard.  Pulmonary/Chest: Breath sounds normal. No respiratory distress. She has no wheezes. She has no rhonchi. She has no rales. She exhibits no tenderness.   Musculoskeletal:      Cervical back: Neck supple.     Neurological: She is alert and oriented to person, place, and time. No cranial nerve deficit. GCS eye subscore is 4. GCS verbal subscore is 5. GCS motor subscore is 6.   Skin: Skin is warm and dry. Capillary refill takes less than 2 seconds.   Psychiatric: She has a normal mood and affect.         Medical Screening Exam   See Full Note    ED Course   Procedures  Labs Reviewed - No data to display       Imaging Results              CT Head Without Contrast (Final result)  Result time 07/30/25 14:29:05      Final result by En Leone MD (07/30/25 14:29:05)                   Impression:      No CT evidence of acute intracranial abnormality.      Electronically signed by: En Leone MD  Date:    07/30/2025  Time:    14:29               Narrative:    EXAMINATION:  CT HEAD WITHOUT CONTRAST    CLINICAL HISTORY:  head  injury, headache;    TECHNIQUE:  Low dose axial images were obtained through the head.  Coronal and sagittal reformations were also performed. Contrast was not administered.    COMPARISON:  None.    FINDINGS:  No evidence of acute territorial infarct, hemorrhage, mass effect, or midline shift.    Ventricles are normal in size and configuration.    No displaced calvarial fracture.    Visualized paranasal sinuses and mastoid air cells are essentially clear.                                       Medications - No data to display  Medical Decision Making  Amount and/or Complexity of Data Reviewed  Radiology: ordered. Decision-making details documented in ED Course.               ED Course as of 07/30/25 1433   Wed Jul 30, 2025   1432 Ct normal. Will dc home [AG]      ED Course User Index  [AG] Clau Olivares FNP                           Clinical Impression:   Final diagnoses:  [S00.83XA] Contusion of face, initial encounter (Primary)        ED Disposition Condition    Discharge Stable          ED Prescriptions    None       Follow-up Information       Follow up With Specialties Details Why Contact Info    Rupesh Mantilla MD Family Medicine Schedule an appointment as soon as possible for a visit in 1 week As needed [address] [place] [address]  [facility] [place] [address]  [phone]                 [1]   Social History  Tobacco Use    Smoking status: Never     Passive exposure: Never    Smokeless tobacco: Never   Vaping Use    Vaping status: Never Used   Substance Use Topics    Alcohol use: Not Currently    Drug use: Never        Clau Olivares FNP  07/30/25 1433

## 2025-07-30 NOTE — DISCHARGE INSTRUCTIONS
Over the counter meds for pain. Return to the ER as needed for new or worsening of your symptoms (especially severe headache, dizziness, altered level of consciousness).

## 2025-07-31 ENCOUNTER — TELEPHONE (OUTPATIENT)
Dept: CARDIOLOGY | Facility: HOSPITAL | Age: 35
End: 2025-07-31
Payer: MEDICAID

## 2025-08-01 ENCOUNTER — HOSPITAL ENCOUNTER (OUTPATIENT)
Dept: CARDIOLOGY | Facility: HOSPITAL | Age: 35
Discharge: HOME | End: 2025-08-01
Attending: INTERNAL MEDICINE
Payer: MEDICAID

## 2025-08-01 DIAGNOSIS — R00.2 PALPITATIONS: ICD-10-CM

## 2025-08-01 LAB
AORTIC ROOT ANNULUS: 3 CM
AORTIC VALVE CUSP SEPERATION: 1.97 CM
AV INDEX (PROSTH): 0.86
AV MEAN GRADIENT: 6 MMHG
AV PEAK GRADIENT: 12 MMHG
AV VALVE AREA BY VELOCITY RATIO: 2.1 CM²
AV VALVE AREA: 2.2 CM²
AV VELOCITY RATIO: 0.82
CV ECHO LV RWT: 0.34 CM
CV STRESS BASE HR: 86 BPM
DIASTOLIC BLOOD PRESSURE: 79 MMHG
DOP CALC AO PEAK VEL: 1.7 M/S
DOP CALC AO VTI: 30.3 CM
DOP CALC LVOT AREA: 2.5 CM2
DOP CALC LVOT DIAMETER: 1.8 CM
DOP CALC LVOT PEAK VEL: 1.4 M/S
DOP CALC MV VTI: 25.9 CM
DOP CALCLVOT PEAK VEL VTI: 26.2 CM
E WAVE DECELERATION TIME: 214 MSEC
E/A RATIO: 1.51
E/E' RATIO: 6 M/S
ECHO LV POSTERIOR WALL: 0.8 CM (ref 0.6–1.1)
FRACTIONAL SHORTENING: 29.8 % (ref 28–44)
INTERVENTRICULAR SEPTUM: 0.9 CM (ref 0.6–1.1)
IVC DIAMETER: 1.69 CM
LEFT ATRIUM AREA SYSTOLIC (APICAL 2 CHAMBER): 14.31 CM2
LEFT ATRIUM AREA SYSTOLIC (APICAL 4 CHAMBER): 17.79 CM2
LEFT ATRIUM VOLUME MOD: 39 ML
LEFT INTERNAL DIMENSION IN SYSTOLE: 3.3 CM (ref 2.1–4)
LEFT VENTRICLE DIASTOLIC VOLUME: 102 ML
LEFT VENTRICLE END SYSTOLIC VOLUME APICAL 2 CHAMBER: 31.94 ML
LEFT VENTRICLE END SYSTOLIC VOLUME APICAL 4 CHAMBER: 40.93 ML
LEFT VENTRICLE SYSTOLIC VOLUME: 43 ML
LEFT VENTRICULAR INTERNAL DIMENSION IN DIASTOLE: 4.7 CM (ref 3.5–6)
LEFT VENTRICULAR MASS: 132.3 G
LV LATERAL E/E' RATIO: 5.9 M/S
LV SEPTAL E/E' RATIO: 6.8 M/S
LVED V (TEICH): 102.04 ML
LVES V (TEICH): 43.02 ML
LVOT MG: 4.12 MMHG
LVOT MV: 0.94 CM/S
MV MEAN GRADIENT: 2 MMHG
MV PEAK A VEL: 0.63 M/S
MV PEAK E VEL: 0.95 M/S
MV PEAK GRADIENT: 5 MMHG
MV STENOSIS PRESSURE HALF TIME: 61.65 MS
MV VALVE AREA BY CONTINUITY EQUATION: 2.57 CM2
MV VALVE AREA P 1/2 METHOD: 3.57 CM2
OHS CV CPX 1 MINUTE RECOVERY HEART RATE: 153 BPM
OHS CV CPX 85 PERCENT MAX PREDICTED HEART RATE MALE: 159
OHS CV CPX ESTIMATED METS: 10.1
OHS CV CPX MAX PREDICTED HEART RATE: 187
OHS CV CPX PATIENT IS FEMALE: 1
OHS CV CPX PATIENT IS MALE: 0
OHS CV CPX PEAK DIASTOLIC BLOOD PRESSURE: 72 MMHG
OHS CV CPX PEAK HEAR RATE: 179 BPM
OHS CV CPX PEAK RATE PRESSURE PRODUCT: NORMAL
OHS CV CPX PEAK SYSTOLIC BLOOD PRESSURE: 160 MMHG
OHS CV CPX PERCENT MAX PREDICTED HEART RATE ACHIEVED: 101
OHS CV CPX RATE PRESSURE PRODUCT PRESENTING: NORMAL
OHS CV RV/LV RATIO: 0.57 CM
PISA MRMAX VEL: 3.68 M/S
PV MV: 0.63 M/S
PV PEAK GRADIENT: 3 MMHG
PV PEAK VELOCITY: 0.87 M/S
RA PRESSURE ESTIMATED: 3 MMHG
RA VOL SYS: 35.26 ML
RIGHT ATRIAL AREA: 15.4 CM2
RIGHT ATRIUM VOLUME AREA LENGTH APICAL 4 CHAMBER: 32.01 ML
RIGHT VENTRICLE DIASTOLIC BASEL DIMENSION: 2.7 CM
RIGHT VENTRICLE DIASTOLIC LENGTH: 8.7 CM
RIGHT VENTRICLE DIASTOLIC MID DIMENSION: 2.6 CM
RIGHT VENTRICULAR LENGTH IN DIASTOLE (APICAL 4-CHAMBER VIEW): 8.72 CM
RV MID DIAMA: 2.57 CM
STRESS ECHO POST EXERCISE DUR MIN: 7 MINUTES
STRESS ECHO POST EXERCISE DUR SEC: 15 SECONDS
SYSTOLIC BLOOD PRESSURE: 134 MMHG
TDI LATERAL: 0.16 M/S
TDI SEPTAL: 0.14 M/S
TDI: 0.15 M/S
TRICUSPID ANNULAR PLANE SYSTOLIC EXCURSION: 2.3 CM

## 2025-08-01 PROCEDURE — 93306 TTE W/DOPPLER COMPLETE: CPT | Mod: 26,,, | Performed by: INTERNAL MEDICINE

## 2025-08-01 PROCEDURE — 93016 CV STRESS TEST SUPVJ ONLY: CPT | Mod: ,,, | Performed by: INTERNAL MEDICINE

## 2025-08-01 PROCEDURE — 93018 CV STRESS TEST I&R ONLY: CPT | Mod: ,,, | Performed by: INTERNAL MEDICINE

## 2025-08-01 PROCEDURE — 93017 CV STRESS TEST TRACING ONLY: CPT

## 2025-08-01 PROCEDURE — 93306 TTE W/DOPPLER COMPLETE: CPT

## 2025-08-05 ENCOUNTER — HOSPITAL ENCOUNTER (OUTPATIENT)
Dept: CARDIOLOGY | Facility: HOSPITAL | Age: 35
Discharge: HOME | End: 2025-08-05
Attending: INTERNAL MEDICINE
Payer: MEDICAID

## 2025-08-05 ENCOUNTER — OFFICE VISIT (OUTPATIENT)
Dept: CARDIOLOGY | Facility: CLINIC | Age: 35
End: 2025-08-05
Payer: MEDICAID

## 2025-08-05 VITALS
HEART RATE: 87 BPM | SYSTOLIC BLOOD PRESSURE: 110 MMHG | DIASTOLIC BLOOD PRESSURE: 88 MMHG | OXYGEN SATURATION: 100 % | WEIGHT: 191 LBS | BODY MASS INDEX: 31.82 KG/M2 | HEIGHT: 65 IN

## 2025-08-05 DIAGNOSIS — R06.09 EXERTIONAL DYSPNEA: ICD-10-CM

## 2025-08-05 DIAGNOSIS — R07.9 CHEST PAIN, UNSPECIFIED TYPE: ICD-10-CM

## 2025-08-05 DIAGNOSIS — R00.2 PALPITATIONS: ICD-10-CM

## 2025-08-05 DIAGNOSIS — R00.2 PALPITATIONS: Primary | ICD-10-CM

## 2025-08-05 LAB — OHS CV CPX PATIENT HEIGHT IN: 65

## 2025-08-05 PROCEDURE — 93246 EXT ECG>7D<15D RECORDING: CPT

## 2025-08-05 PROCEDURE — 99999 PR PBB SHADOW E&M-EST. PATIENT-LVL IV: CPT | Mod: PBBFAC,,, | Performed by: INTERNAL MEDICINE

## 2025-08-05 PROCEDURE — 99214 OFFICE O/P EST MOD 30 MIN: CPT | Mod: PBBFAC,25 | Performed by: INTERNAL MEDICINE

## 2025-08-05 PROCEDURE — 99214 OFFICE O/P EST MOD 30 MIN: CPT | Mod: S$PBB,,, | Performed by: INTERNAL MEDICINE

## 2025-08-05 NOTE — PROGRESS NOTES
PCP: Rupesh Mantilla MD    Referring Provider:     Subjective:   Eliza Hawk is a 35 y.o. female with hx of previous depression and anxiety who presents for palpitations and dyspnea. She notes palpitations have improved, however still occur several times a day,  She notes overall feels better on Zoloft.  She states palpitations only occur at rest or laying down and are occur almost ever day. They are typically alleviated with exercise and last 3-5 minutes in duration.      Fhx:  Family History   Problem Relation Name Age of Onset    Atrial fibrillation Mother      Diabetes Father Franco Hawk     Heart disease Father Franco Hawk     Hypertension Father Franco Hawk       Shx:   Past Surgical History:   Procedure Laterality Date    TONSILLECTOMY         EKG - Status: Final result       Next appt: 08/01/2025 at 09:00 AM in Cardiology ([facility])       Dx: Palpitations    Test Result Released: Yes (seen)    0 Result Notes       Component  Ref Range & Units (hover) 3 wk ago 8 mo ago   QRS Duration 72 78   [facility] QTC Calculation 408 439   Resulting Agency [facility] G[facility]              Narrative  Performed by: [facility]  Test Reason : R00.2,    Vent. Rate :  80 BPM     Atrial Rate :  80 BPM     P-R Int : 142 ms          QRS Dur :  72 ms      QT Int : 354 ms       P-R-T Axes :  36  60   9 degrees    QTcB Int : 408 ms    Normal sinus rhythm  Normal ECG  When compared with ECG of 07-Oct-2024 18:53,  No significant change was found  Confirmed by Loco Batista ([id number]) on 6/10/2025 12:25:40 PM       ECHO - No results found for this or any previous visit.   Results for orders placed during the hospital encounter of 08/01/25    Echo    Interpretation Summary    Left Ventricle: The left ventricle is normal in size. Normal wall thickness. There is normal systolic function with a visually estimated ejection fraction of 55 - 60%. There is normal diastolic function.    Right Ventricle: The right ventricle is normal in size  "measuring 2.7 cm. Systolic function is normal.    Aortic Valve: The aortic valve is a trileaflet valve. There is mild aortic valve sclerosis.    Mitral Valve: There is moderate to severe regurgitation.    IVC/SVC: Normal venous pressure at 3 mmHg.     CATH - No results found for this or any previous visit.       Stress - No results found for this or any previous visit.   Results for orders placed during the hospital encounter of 08/01/25    Exercise Stress - EKG    Interpretation Summary    The patient exercised for 7 minutes 15 seconds on a Delbert protocol, achieving a peak heart rate of 179 bpm, which is 95% of the age predicted maximum heart rate.    The ECG portion of the study is abnormal but not diagnostic.    The patient reported no chest pain during the stress test.    The blood pressure response to stress was normal.      Lab Results   Component Value Date     07/14/2025    K 3.9 07/14/2025     07/14/2025    CO2 22 07/14/2025    BUN 12 07/14/2025    CREATININE 0.91 07/14/2025    CALCIUM 10.0 07/14/2025    ANIONGAP 15 07/14/2025       No results found for: "CHOL"  No results found for: "HDL"  No results found for: "LDLCALC"  No results found for: "TRIG"  No results found for: "CHOLHDL"    Lab Results   Component Value Date    WBC 7.36 07/14/2025    HGB 14.0 07/14/2025    HCT 39.6 07/14/2025    MCV 84.4 07/14/2025     07/14/2025         Current Medications[1]  No current outpatient medications on file.   Review of Systems   Constitutional: Negative for chills, fever, night sweats, weight gain and weight loss.   HENT:  Negative for ear discharge, ear pain and hearing loss.    Eyes:  Negative for blurred vision and double vision.   Cardiovascular:  Positive for palpitations. Negative for chest pain, leg swelling, near-syncope and syncope.   Respiratory:  Positive for shortness of breath. Negative for cough, snoring and wheezing.    Endocrine: Negative for cold intolerance and heat intolerance. " "  Skin:  Negative for color change and nail changes.   Musculoskeletal:  Positive for arthritis. Negative for back pain and joint pain.   Gastrointestinal:  Negative for bloating, change in bowel habit, bowel incontinence, constipation, diarrhea and melena.   Genitourinary:  Negative for flank pain and frequency.          Objective:   /88   Pulse 87   Ht 5' 5" (1.651 m)   Wt 86.6 kg (191 lb)   LMP 07/06/2025 (Approximate)   SpO2 100%   BMI 31.78 kg/m²       Physical Exam  Vitals and nursing note reviewed.   Constitutional:       Appearance: Normal appearance.   HENT:      Head: Normocephalic and atraumatic.      Right Ear: External ear normal.      Left Ear: External ear normal.   Eyes:      General: No scleral icterus.        Right eye: No discharge.         Left eye: No discharge.      Extraocular Movements: Extraocular movements intact.      Conjunctiva/sclera: Conjunctivae normal.      Pupils: Pupils are equal, round, and reactive to light.   Cardiovascular:      Rate and Rhythm: Normal rate and regular rhythm.      Pulses: Normal pulses.      Heart sounds: Normal heart sounds. No murmur heard.     No friction rub. No gallop.   Pulmonary:      Effort: Pulmonary effort is normal.      Breath sounds: Normal breath sounds. No wheezing, rhonchi or rales.   Chest:      Chest wall: No tenderness.   Abdominal:      General: Abdomen is flat. Bowel sounds are normal. There is no distension.      Palpations: Abdomen is soft.      Tenderness: There is no abdominal tenderness. There is no guarding or rebound.   Musculoskeletal:         General: No swelling or tenderness. Normal range of motion.      Cervical back: Normal range of motion and neck supple.   Skin:     General: Skin is warm and dry.      Findings: No erythema or rash.   Neurological:      General: No focal deficit present.      Mental Status: She is alert and oriented to person, place, and time.      Cranial Nerves: No cranial nerve deficit.      " Motor: No weakness.      Gait: Gait normal.   Psychiatric:         Mood and Affect: Mood normal.         Behavior: Behavior normal.         Thought Content: Thought content normal.         Judgment: Judgment normal.         Assessment:     1. Palpitations  Cardiac Monitor - 3-15 Day Adult    unclear etiology, outpatient tele recording lost in mail, will replace on [employer] today.      2. Chest pain, unspecified type      chest pain has resolved, reviewed stress test, no inducible ischemia with excellent exercise tolerance.      3. Exertional dyspnea      symptoms have improved with increased activity, suspect component of deconditioning, encouraged to increase exercise as tolerated, goal 30 mins daily 5/7              Plan:   Will repeat outpatient tele, follow up in four to five weeks to review results, encourage increased exercise with goal of walking 30 minutes q d five days a week.                  [1]   Current Outpatient Medications:     sertraline (ZOLOFT) 25 MG tablet, Take 1 tablet (25 mg total) by mouth once daily., Disp: 30 tablet, Rfl: 11